# Patient Record
Sex: FEMALE | Race: OTHER | HISPANIC OR LATINO | Employment: UNEMPLOYED | URBAN - METROPOLITAN AREA
[De-identification: names, ages, dates, MRNs, and addresses within clinical notes are randomized per-mention and may not be internally consistent; named-entity substitution may affect disease eponyms.]

---
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
## 2024-05-03 ENCOUNTER — HOSPITAL ENCOUNTER (EMERGENCY)
Facility: HOSPITAL | Age: 19
Discharge: HOME | End: 2024-05-03
Attending: EMERGENCY MEDICINE
Payer: COMMERCIAL

## 2024-05-03 VITALS
SYSTOLIC BLOOD PRESSURE: 126 MMHG | TEMPERATURE: 98.2 F | OXYGEN SATURATION: 99 % | HEART RATE: 101 BPM | RESPIRATION RATE: 20 BRPM | DIASTOLIC BLOOD PRESSURE: 75 MMHG

## 2024-05-03 DIAGNOSIS — R05.9 COUGH: ICD-10-CM

## 2024-05-03 DIAGNOSIS — J04.0 LARYNGITIS: Primary | ICD-10-CM

## 2024-05-03 LAB
FLUAV RNA RESP QL NAA+PROBE: NEGATIVE
FLUBV RNA RESP QL NAA+PROBE: NEGATIVE
RSV RNA RESP QL NAA+PROBE: NEGATIVE
S PYO DNA THROAT QL NAA+PROBE: NOT DETECTED
SARS-COV-2 RNA RESP QL NAA+PROBE: NEGATIVE

## 2024-05-03 PROCEDURE — 0241U HB NFCT DS VIR RESP RNA 4 TRGT: CPT

## 2024-05-03 PROCEDURE — 99284 EMERGENCY DEPT VISIT MOD MDM: CPT | Performed by: EMERGENCY MEDICINE

## 2024-05-03 PROCEDURE — 99283 EMERGENCY DEPT VISIT LOW MDM: CPT

## 2024-05-03 PROCEDURE — 87651 STREP A DNA AMP PROBE: CPT | Performed by: EMERGENCY MEDICINE

## 2024-05-03 RX ORDER — GUAIFENESIN 600 MG/1
600 TABLET, EXTENDED RELEASE ORAL 2 TIMES DAILY
Qty: 28 TABLET | Refills: 0 | Status: SHIPPED | OUTPATIENT
Start: 2024-05-03

## 2024-05-03 RX ORDER — LIDOCAINE HYDROCHLORIDE 20 MG/ML
15 SOLUTION OROPHARYNGEAL ONCE
Status: COMPLETED | OUTPATIENT
Start: 2024-05-03 | End: 2024-05-03

## 2024-05-03 RX ORDER — GUAIFENESIN 600 MG/1
600 TABLET, EXTENDED RELEASE ORAL ONCE
Status: COMPLETED | OUTPATIENT
Start: 2024-05-03 | End: 2024-05-03

## 2024-05-03 RX ORDER — DEXAMETHASONE 4 MG/1
6 TABLET ORAL ONCE
Status: COMPLETED | OUTPATIENT
Start: 2024-05-03 | End: 2024-05-03

## 2024-05-03 RX ADMIN — Medication 15 ML: at 22:29

## 2024-05-03 RX ADMIN — GUAIFENESIN 600 MG: 600 TABLET ORAL at 22:27

## 2024-05-03 RX ADMIN — DEXAMETHASONE 6 MG: 4 TABLET ORAL at 22:35

## 2024-05-03 NOTE — Clinical Note
Sheila Amaro was seen and treated in our emergency department on 5/3/2024.                Diagnosis:     Sheila  may return to work on return date.    She may return on this date: 05/06/2024         If you have any questions or concerns, please don't hesitate to call.      Nicole Bosch, DO    ______________________________           _______________          _______________  Hospital Representative                              Date                                Time

## 2024-05-04 NOTE — ED PROVIDER NOTES
History  Chief Complaint   Patient presents with    Cough     Pt reports cough and sore throat x3 days     HPI  (Sheila Amaro) Sheila Amaro is a 19 y.o. female     They presented to the emergency department on May 4, 2024.   Chief Complaint   Patient presents with    Cough     Pt reports cough and sore throat x3 days   .    The patient states that lost her voice. Patient denies fever, chills, chest pain, shortness of breath, nausea, vomiting, abdominal pain bowel bladder changes, ear pain, or any other complaint at this time.        Prior to Admission Medications   Prescriptions Last Dose Informant Patient Reported? Taking?   albuterol (PROVENTIL HFA,VENTOLIN HFA) 90 mcg/act inhaler   No No   Sig: Inhale 2 puffs every 6 (six) hours as needed for wheezing or shortness of breath With spacer   albuterol (VENTOLIN HFA) 90 mcg/act inhaler   No No   Sig: Inhale 2 puffs every 4 (four) hours as needed for wheezing   Patient not taking: No sig reported   cetirizine (ZyrTEC) 10 mg tablet   No No   Sig: Take 1 tablet (10 mg total) by mouth daily   fluticasone (FLONASE) 50 mcg/act nasal spray   No No   Si sprays into each nostril daily   Patient not taking: No sig reported   medroxyPROGESTERone (DEPO-PROVERA) 150 mg/mL injection   No No   Sig: Inject 1 mL (150 mg total) into a muscle every 3 (three) months   Patient not taking: No sig reported   nystatin (MYCOSTATIN) cream   No No   Sig: Apply topically 4 (four) times a day for 14 days   ondansetron (ZOFRAN) 4 mg tablet   No No   Sig: Take 1 tablet (4 mg total) by mouth every 6 (six) hours      Facility-Administered Medications Last Administration Doses Remaining   medroxyPROGESTERone (DEPO-PROVERA) IM injection 150 mg 2022  3:18 PM           Past Medical History:   Diagnosis Date    Asthma        Past Surgical History:   Procedure Laterality Date    APPENDECTOMY      8-8 years of age        Family History   Problem Relation Age of Onset    Asthma Mother     No Known  Problems Father     No Known Problems Brother     Hypertension Maternal Grandmother     Asthma Maternal Grandmother     Arthritis Maternal Grandmother     No Known Problems Maternal Grandfather     No Known Problems Paternal Grandmother     No Known Problems Paternal Grandfather     No Known Problems Brother     No Known Problems Brother     No Known Problems Brother      I have reviewed and agree with the history as documented.    E-Cigarette/Vaping    E-Cigarette Use Never User      E-Cigarette/Vaping Substances    Nicotine No     THC No     CBD No     Flavoring No     Other No     Unknown No      Social History     Tobacco Use    Smoking status: Never    Smokeless tobacco: Never   Vaping Use    Vaping status: Never Used   Substance Use Topics    Alcohol use: Never    Drug use: Never        Review of Systems   Constitutional:  Negative for chills and fever.   HENT:  Positive for sore throat and voice change. Negative for ear pain.    Eyes:  Negative for pain and visual disturbance.   Respiratory:  Positive for cough. Negative for shortness of breath.    Cardiovascular:  Negative for chest pain and palpitations.   Gastrointestinal:  Negative for abdominal pain, constipation, diarrhea, nausea and vomiting.   Genitourinary:  Negative for dysuria and hematuria.   Musculoskeletal:  Negative for arthralgias and back pain.   Skin:  Negative for color change and rash.   Neurological:  Negative for seizures and syncope.   All other systems reviewed and are negative.      Physical Exam  ED Triage Vitals [05/03/24 2140]   Temperature Pulse Respirations Blood Pressure SpO2   98.2 °F (36.8 °C) 101 20 126/75 99 %      Temp Source Heart Rate Source Patient Position - Orthostatic VS BP Location FiO2 (%)   Oral Monitor -- Right arm --      Pain Score       --             Orthostatic Vital Signs  Vitals:    05/03/24 2140   BP: 126/75   Pulse: 101       Physical Exam  Vitals and nursing note reviewed.   Constitutional:        General: She is not in acute distress.     Appearance: She is well-developed.      Comments: Patient with hoarse voice consistent with laryngitis   HENT:      Head: Normocephalic and atraumatic.   Eyes:      Conjunctiva/sclera: Conjunctivae normal.   Cardiovascular:      Rate and Rhythm: Normal rate and regular rhythm.      Heart sounds: No murmur heard.  Pulmonary:      Effort: Pulmonary effort is normal. No respiratory distress.      Breath sounds: Normal breath sounds. No wheezing, rhonchi or rales.      Comments: Cough during exam, dry cough.  Abdominal:      Palpations: Abdomen is soft.      Tenderness: There is no abdominal tenderness.   Musculoskeletal:         General: No swelling.      Cervical back: Neck supple.   Skin:     General: Skin is warm and dry.      Capillary Refill: Capillary refill takes less than 2 seconds.   Neurological:      Mental Status: She is alert.   Psychiatric:         Mood and Affect: Mood normal.         ED Medications  Medications   Lidocaine Viscous HCl (XYLOCAINE) 2 % mucosal solution 15 mL (15 mL Swish & Spit Given 5/3/24 2229)   guaiFENesin (MUCINEX) 12 hr tablet 600 mg (600 mg Oral Given 5/3/24 2227)   dexamethasone (DECADRON) tablet 6 mg (6 mg Oral Given 5/3/24 2235)       Diagnostic Studies  Results Reviewed       Procedure Component Value Units Date/Time    Strep A PCR [[id number]]  (Normal) Collected: 05/03/24 2246    Lab Status: Final result Specimen: Throat Updated: 05/03/24 2328     STREP A PCR Not Detected    FLU/RSV/COVID - if FLU/RSV clinically relevant [[id number]]  (Normal) Collected: 05/03/24 2230    Lab Status: Final result Specimen: Nares from Nose Updated: 05/03/24 2322     SARS-CoV-2 Negative     INFLUENZA A PCR Negative     INFLUENZA B PCR Negative     RSV PCR Negative    Narrative:      FOR PEDIATRIC PATIENTS - copy/paste COVID Guidelines URL to browser: [email or web address]    SARS-CoV-2 assay is a  Nucleic Acid Amplification assay intended for the  qualitative detection of nucleic acid from SARS-CoV-2 in nasopharyngeal  swabs. Results are for the presumptive identification of SARS-CoV-2 RNA.    Positive results are indicative of infection with SARS-CoV-2, the virus  causing COVID-19, but do not rule out bacterial infection or co-infection  with other viruses. Laboratories within the [place] and its  territories are required to report all positive results to the appropriate  public health authorities. Negative results do not preclude SARS-CoV-2  infection and should not be used as the sole basis for treatment or other  patient management decisions. Negative results must be combined with  clinical observations, patient history, and epidemiological information.  This test has not been FDA cleared or approved.    This test has been authorized by FDA under an Emergency Use Authorization  (EUA). This test is only authorized for the duration of time the  declaration that circumstances exist justifying the authorization of the  emergency use of an in vitro diagnostic tests for detection of SARS-CoV-2  virus and/or diagnosis of COVID-19 infection under section 564(b)(1) of  the Act, 21 U.S.C. 360bbb-3(b)(1), unless the authorization is terminated  or revoked sooner. The test has been validated but independent review by FDA  and CLIA is pending.    Test performed using [employer] GeneXpert: This RT-PCR assay targets N2,  a region unique to SARS-CoV-2. A conserved region in the E-gene was chosen  for pan-Sarbecovirus detection which includes SARS-CoV-2.    According to CMS-2020-01-R, this platform meets the definition of high-throughput technology.                   No orders to display         Procedures  Procedures      ED Course  ED Course as of 05/04/24 0656   Fri May 03, 2024   2230 Will obtain strep and flu/COVID/RSV.  Will not have patient wait on results, instructed patient that we will contact her if the  results are positive and we will send medications to the pharmacy.  Patient expressed understanding was agreeable with this plan.   Sat May 04, 2024   0654 SARS-COV-2: Negative   0654 INFLU A PCR: Negative   0654 INFLU B PCR: Negative   0654 RSV PCR: Negative   0654 STREP A PCR: Not Detected                                       Medical Decision Making  20 yo F presented to ED for cough. Associated symptoms: sore throat, hoarse voice. Exam findings: Voice hoarseness consistent with pharyngitis, mild posterior oropharynx erythema no tonsillar exudates or tonsillar edema lungs clear to auscultation bilaterally, heart regular rate and rhythm.  Differentials diagnoses considered: Strep pharyngitis versus COVID/flu/RSV versus laryngitis versus other viral syndrome. Patient was given his lidocaine for sore throat, Mucinex for cough, Decadron for laryngitis.  Obtained COVID/flu/RSV swab, as well as strep swab for sore throat.  Discussed with patient staying for the results versus going home, she opted for prolonged home and to be contacted as results is positive.  Results are negative at this time after patient started discharge.  Based on these results and H&P, most likely due to viral syndrome. Results and clinical impressions were discussed with patient and family. They expressed understanding. Plan: Discharged home instructed to follow-up primary care doctor, instructed to return emerged part for any new or worsening symptoms, prescription for Mucinex sent to pharmacy. This plan was also discussed with patient, who was agreeable with this plan. Patient was given the opportunity to ask questions in ED. All questions and concerns were addressed in ED.     Amount and/or Complexity of Data Reviewed  Labs: ordered.    Risk  OTC drugs.  Prescription drug management.          Disposition  Final diagnoses:   Laryngitis   Cough     Time reflects when diagnosis was documented in both MDM as applicable and the Disposition within  this note       Time User Action Codes Description Comment    5/3/2024 10:53 PM Nicole Bosch Add [J04.0] Laryngitis     5/3/2024 10:54 PM Nicole Bosch Add [R05.2] Subacute cough     5/3/2024 10:54 PM Nicole Bosch Remove [R05.2] Subacute cough     5/3/2024 10:54 PM Nicole Bosch Add [R05.9] Cough           ED Disposition       ED Disposition   Discharge    Condition   Stable    Date/Time   Fri May 3, 2024 10:54 PM    Comment   Sheila Hudsonz discharge to home/self care.                   Follow-up Information       Follow up With Specialties Details Why Contact Info Additional Information    Your Primary Care Doctor  Call today let them know you were evaluated in the ER and would like to schedule a follow-up appointment      [facility] Emergency Department Emergency Medicine Go to  As needed, If symptoms worsen [address] [place] [id number]  [phone] [facility] Emergency Department, [facility] [place], [place], [address]            Discharge Medication List as of 5/3/2024 10:56 PM        START taking these medications    Details   guaiFENesin (MUCINEX) 600 mg 12 hr tablet Take 1 tablet (600 mg total) by mouth 2 (two) times a day, Starting Fri 5/3/2024, Normal           CONTINUE these medications which have NOT CHANGED    Details   !! albuterol (PROVENTIL HFA,VENTOLIN HFA) 90 mcg/act inhaler Inhale 2 puffs every 6 (six) hours as needed for wheezing or shortness of breath With spacer, Starting Sat 12/23/2023, Normal      !! albuterol (VENTOLIN HFA) 90 mcg/act inhaler Inhale 2 puffs every 4 (four) hours as needed for wheezing, Starting Fri 2/1/2019, Normal      cetirizine (ZyrTEC) 10 mg tablet Take 1 tablet (10 mg total) by mouth daily, Starting Mon 4/5/2021, Until Tue 9/13/2022, Normal      fluticasone (FLONASE) 50 mcg/act nasal spray 2 sprays into each nostril daily, Starting Mon 4/5/2021, Normal      medroxyPROGESTERone  (DEPO-PROVERA) 150 mg/mL injection Inject 1 mL (150 mg total) into a muscle every 3 (three) months, Starting Tue 1/18/2022, Normal      nystatin (MYCOSTATIN) cream Apply topically 4 (four) times a day for 14 days, Starting Mon 4/5/2021, Until Mon 4/19/2021, Normal      ondansetron (ZOFRAN) 4 mg tablet Take 1 tablet (4 mg total) by mouth every 6 (six) hours, Starting Tue 12/20/2022, Normal       !! - Potential duplicate medications found. Please discuss with provider.            PDMP Review       None             ED Provider  Attending physically available and evaluated Sheila Amaro. I managed the patient along with the ED Attending.    Electronically Signed by           Nicole Bosch DO  05/04/24 0743

## 2024-05-07 NOTE — ED ATTENDING ATTESTATION
5/3/2024  I, Eve Staley MD, saw and evaluated the patient. I have discussed the patient with the resident/non-physician practitioner and agree with the resident's/non-physician practitioner's findings, Plan of Care, and MDM as documented in the resident's/non-physician practitioner's note, except where noted. All available labs and Radiology studies were reviewed.  I was present for key portions of any procedure(s) performed by the resident/non-physician practitioner and I was immediately available to provide assistance.       At this point I agree with the current assessment done in the Emergency Department.  I have conducted an independent evaluation of this patient a history and physical is as follows:    19-year-old presented to the ER with cough, sore throat and lost her voice.  Lungs are clear.  Normal cardiac exam.  No abscess in the mouth.  No stridor.  Tolerating secretions.  Likely combination of pharyngitis/laryngitis.  Agree with steroids.  Viral swab, strep swab    ED Course         Critical Care Time  Procedures

## 2025-05-23 ENCOUNTER — APPOINTMENT (EMERGENCY)
Dept: RADIOLOGY | Facility: HOSPITAL | Age: 20
End: 2025-05-23

## 2025-05-23 ENCOUNTER — HOSPITAL ENCOUNTER (EMERGENCY)
Facility: HOSPITAL | Age: 20
Discharge: HOME | End: 2025-05-23
Attending: EMERGENCY MEDICINE

## 2025-05-23 VITALS
TEMPERATURE: 98.1 F | DIASTOLIC BLOOD PRESSURE: 67 MMHG | OXYGEN SATURATION: 97 % | HEART RATE: 87 BPM | RESPIRATION RATE: 16 BRPM | WEIGHT: 125.22 LBS | SYSTOLIC BLOOD PRESSURE: 92 MMHG | BODY MASS INDEX: 22.9 KG/M2

## 2025-05-23 DIAGNOSIS — O36.80X0 PREGNANCY OF UNKNOWN ANATOMIC LOCATION: Primary | ICD-10-CM

## 2025-05-23 LAB
ALBUMIN SERPL BCG-MCNC: 4.3 G/DL (ref 3.5–5)
ALP SERPL-CCNC: 60 U/L (ref 34–104)
ALT SERPL W P-5'-P-CCNC: 6 U/L (ref 7–52)
ANION GAP SERPL CALCULATED.3IONS-SCNC: 8 MMOL/L (ref 4–13)
AST SERPL W P-5'-P-CCNC: 11 U/L (ref 13–39)
B-HCG SERPL-ACNC: 8461 MIU/ML (ref 0–5)
BACTERIA UR QL AUTO: ABNORMAL /HPF
BASOPHILS # BLD AUTO: 0.04 THOUSANDS/ÂΜL (ref 0–0.1)
BASOPHILS NFR BLD AUTO: 1 % (ref 0–1)
BILIRUB SERPL-MCNC: 0.4 MG/DL (ref 0.2–1)
BILIRUB UR QL STRIP: NEGATIVE
BUN SERPL-MCNC: 17 MG/DL (ref 5–25)
CALCIUM SERPL-MCNC: 9.2 MG/DL (ref 8.4–10.2)
CHLORIDE SERPL-SCNC: 106 MMOL/L (ref 96–108)
CLARITY UR: CLEAR
CO2 SERPL-SCNC: 20 MMOL/L (ref 21–32)
COLOR UR: COLORLESS
CREAT SERPL-MCNC: 0.6 MG/DL (ref 0.6–1.3)
EOSINOPHIL # BLD AUTO: 0.05 THOUSAND/ÂΜL (ref 0–0.61)
EOSINOPHIL NFR BLD AUTO: 1 % (ref 0–6)
ERYTHROCYTE [DISTWIDTH] IN BLOOD BY AUTOMATED COUNT: 13.2 % (ref 11.6–15.1)
EXT PREGNANCY TEST URINE: POSITIVE
EXT. CONTROL: ABNORMAL
GFR SERPL CREATININE-BSD FRML MDRD: 130 ML/MIN/1.73SQ M
GLUCOSE SERPL-MCNC: 82 MG/DL (ref 65–140)
GLUCOSE UR STRIP-MCNC: NEGATIVE MG/DL
HCT VFR BLD AUTO: 39 % (ref 34.8–46.1)
HGB BLD-MCNC: 13.4 G/DL (ref 11.5–15.4)
HGB UR QL STRIP.AUTO: NEGATIVE
IMM GRANULOCYTES # BLD AUTO: 0.01 THOUSAND/UL (ref 0–0.2)
IMM GRANULOCYTES NFR BLD AUTO: 0 % (ref 0–2)
KETONES UR STRIP-MCNC: NEGATIVE MG/DL
LEUKOCYTE ESTERASE UR QL STRIP: ABNORMAL
LIPASE SERPL-CCNC: 20 U/L (ref 11–82)
LYMPHOCYTES # BLD AUTO: 2.15 THOUSANDS/ÂΜL (ref 0.6–4.47)
LYMPHOCYTES NFR BLD AUTO: 29 % (ref 14–44)
MCH RBC QN AUTO: 31.3 PG (ref 26.8–34.3)
MCHC RBC AUTO-ENTMCNC: 34.4 G/DL (ref 31.4–37.4)
MCV RBC AUTO: 91 FL (ref 82–98)
MONOCYTES # BLD AUTO: 0.56 THOUSAND/ÂΜL (ref 0.17–1.22)
MONOCYTES NFR BLD AUTO: 8 % (ref 4–12)
NEUTROPHILS # BLD AUTO: 4.61 THOUSANDS/ÂΜL (ref 1.85–7.62)
NEUTS SEG NFR BLD AUTO: 61 % (ref 43–75)
NITRITE UR QL STRIP: NEGATIVE
NON-SQ EPI CELLS URNS QL MICRO: ABNORMAL /HPF
NRBC BLD AUTO-RTO: 0 /100 WBCS
PH UR STRIP.AUTO: 6 [PH]
PLATELET # BLD AUTO: 215 THOUSANDS/UL (ref 149–390)
PMV BLD AUTO: 10.3 FL (ref 8.9–12.7)
POTASSIUM SERPL-SCNC: 3.9 MMOL/L (ref 3.5–5.3)
PROT SERPL-MCNC: 7.5 G/DL (ref 6.4–8.4)
PROT UR STRIP-MCNC: NEGATIVE MG/DL
RBC # BLD AUTO: 4.28 MILLION/UL (ref 3.81–5.12)
RBC #/AREA URNS AUTO: ABNORMAL /HPF
SODIUM SERPL-SCNC: 134 MMOL/L (ref 135–147)
SP GR UR STRIP.AUTO: 1.01 (ref 1–1.03)
UROBILINOGEN UR STRIP-ACNC: <2 MG/DL
WBC # BLD AUTO: 7.42 THOUSAND/UL (ref 4.31–10.16)
WBC #/AREA URNS AUTO: ABNORMAL /HPF

## 2025-05-23 PROCEDURE — 87591 N.GONORRHOEAE DNA AMP PROB: CPT

## 2025-05-23 PROCEDURE — 99285 EMERGENCY DEPT VISIT HI MDM: CPT | Performed by: EMERGENCY MEDICINE

## 2025-05-23 PROCEDURE — 87491 CHLMYD TRACH DNA AMP PROBE: CPT

## 2025-05-23 PROCEDURE — 83690 ASSAY OF LIPASE: CPT | Performed by: EMERGENCY MEDICINE

## 2025-05-23 PROCEDURE — 80053 COMPREHEN METABOLIC PANEL: CPT | Performed by: EMERGENCY MEDICINE

## 2025-05-23 PROCEDURE — 81025 URINE PREGNANCY TEST: CPT | Performed by: EMERGENCY MEDICINE

## 2025-05-23 PROCEDURE — 84702 CHORIONIC GONADOTROPIN TEST: CPT

## 2025-05-23 PROCEDURE — 85025 COMPLETE CBC W/AUTO DIFF WBC: CPT | Performed by: EMERGENCY MEDICINE

## 2025-05-23 PROCEDURE — 36415 COLL VENOUS BLD VENIPUNCTURE: CPT

## 2025-05-23 PROCEDURE — 99284 EMERGENCY DEPT VISIT MOD MDM: CPT

## 2025-05-23 PROCEDURE — 76815 OB US LIMITED FETUS(S): CPT

## 2025-05-23 PROCEDURE — 81001 URINALYSIS AUTO W/SCOPE: CPT

## 2025-05-23 NOTE — ED ATTENDING ATTESTATION
5/23/2025  I, Zeb Ovalles MD, saw and evaluated the patient. I have discussed the patient with the resident/non-physician practitioner and agree with the resident's/non-physician practitioner's findings, Plan of Care, and MDM as documented in the resident's/non-physician practitioner's note, except where noted. All available labs and Radiology studies were reviewed.  I was present for key portions of any procedure(s) performed by the resident/non-physician practitioner and I was immediately available to provide assistance.       At this point I agree with the current assessment done in the Emergency Department.  I have conducted an independent evaluation of this patient a history and physical is as follows:    20-year-old female presents to the emergency department for evaluation of lower abdominal pain.  Pain is associated with some nausea, but no vomiting.  No dysuria or hematuria.  No abnormal vaginal bleeding or discharge.  No fevers or chills.    On exam, patient was comfortably in bed in no acute distress, and is normocephalic atraumatic, pupils equal round reactive, heart is regular rate and rhythm with a distal pulse, no increased work of breathing respiratory distress, or stridor.  Abdomen is soft, nontender nondistended without rebound or guarding.    Differential diagnosis includes but is not limited to pregnancy, cystitis, ectopic pregnancy, colitis, viral illness.  First nurse abdominal labs reviewed and unremarkable.  Urine pregnancy test was positive.  Will check quantitative hCG, urinalysis and test for gonorrhea/chlamydia.    Urinalysis negative for infection, unable to visualize definitive IUP on POCUS, will get formal pelvic ultrasound.    Pelvic ultrasound demonstrates gestational sac, likely representing early gestation.  Symptoms currently well-controlled at this time.  Stable for discharge with outpatient OB/GYN follow-up, referral was placed.  Patient instructed to start taking prenatal  vitamins and to take Tylenol 500 mg every 6 hours as needed for pain.          ED Course         Critical Care Time  Procedures

## 2025-05-23 NOTE — ED PROVIDER NOTES
"Time reflects when diagnosis was documented in both MDM as applicable and the Disposition within this note       Time User Action Codes Description Comment    5/23/2025  7:55 PM Eleanor Pan Add [O36.80X0] Pregnancy of unknown anatomic location           ED Disposition       ED Disposition   Discharge    Condition   Stable    Date/Time   Fri May 23, 2025  7:55 PM    Comment   Sheila Amaro discharge to home/self care.                   Assessment & Plan       Medical Decision Making  Patient is a 20 y.o. female who presents to the ED with lower abdominal pain and nausea.    Vital signs within normal limits. On exam soft nontender abdomen, well-appearing.    History and physical exam most consistent with early pregnancy. However, differential diagnosis included but not limited to UTI, STD, ectopic pregnancy, GI illness.     Plan: CBC, CMP, lipase, urine pregnancy    Urine pregnancy is positive.  Will order quantitative level.  Patient like to be tested for gonorrhea and chlamydia as she has a history of these STDs.    View ED course above for further discussion on patient workup.         All labs reviewed and utilized in the medical decision making process  All radiology studies independently viewed by me and interpreted by the radiologist.  I reviewed all testing with the patient.     Upon re-evaluation patient remains asymptomatic at this time.  Formal ultrasound saw possible single intrauterine gestational sac however no fetal pole or yolk sac.  Referral to OB/GYN has been placed to follow-up to confirm intrauterine pregnancy.  Patient will receive phone call with the  chlamydia results if positive. Discussed return precautions and supportive care. Encouraged PCP followup. Patient/guardian agrees and understands plan. All questions answered.       Disposition: Stable for discharge    Portions of the record may have been created with voice recognition software. Occasional wrong word or \"sound a like\" " substitutions may have occurred due to the inherent limitations of voice recognition software. Read the chart carefully and recognize, using context, where substitutions have occurred.      Amount and/or Complexity of Data Reviewed  Labs: ordered. Decision-making details documented in ED Course.  Radiology: ordered.        ED Course as of 05/24/25 1301   Fri May 23, 2025   1822 PREGNANCY TEST URINE(!): Positive   1855 Attempted POCUS pelvis, visualized possible gestational sac however no fetal pole or yolk sac       Medications - No data to display    ED Risk Strat Scores                    No data recorded                            History of Present Illness       Chief Complaint   Patient presents with    Abdominal Pain     Lower quadrant with nausea for the past week.       Past Medical History[1]   Past Surgical History[2]   Family History[3]   Social History[4]   E-Cigarette/Vaping    E-Cigarette Use Never User       E-Cigarette/Vaping Substances    Nicotine No     THC No     CBD No     Flavoring No     Other No     Unknown No       I have reviewed and agree with the history as documented.     20-year-old female past medical history of STDs presents for evaluation of lower abdominal pain and nausea.  Denies vomiting.  Denies diarrhea.  This started about a week ago.  Her last menstrual period started on April 19.  Denies dysuria, hematuria, urinary frequency, vaginal discharge, vaginal bleeding, fever, chills.      Abdominal Pain  Associated symptoms: nausea    Associated symptoms: no chest pain, no chills, no cough, no dysuria, no fever, no hematuria, no shortness of breath, no sore throat, no vaginal bleeding, no vaginal discharge and no vomiting        Review of Systems   Constitutional:  Negative for chills and fever.   HENT:  Negative for ear pain and sore throat.    Eyes:  Negative for pain and visual disturbance.   Respiratory:  Negative for cough and shortness of breath.    Cardiovascular:  Negative  for chest pain and palpitations.   Gastrointestinal:  Positive for abdominal pain and nausea. Negative for vomiting.   Genitourinary:  Negative for dysuria, flank pain, hematuria, vaginal bleeding and vaginal discharge.   Musculoskeletal:  Negative for arthralgias and back pain.   Skin:  Negative for color change and rash.   Neurological:  Negative for seizures and syncope.   All other systems reviewed and are negative.          Objective       ED Triage Vitals [05/23/25 1602]   Temperature Pulse Blood Pressure Respirations SpO2 Patient Position - Orthostatic VS   (!) 97.2 °F (36.2 °C) 94 108/68 16 98 % Sitting      Temp Source Heart Rate Source BP Location FiO2 (%) Pain Score    Temporal Monitor Left arm -- 7      Vitals      Date and Time Temp Pulse SpO2 Resp BP Pain Score FACES Pain Rating User   05/23/25 1752 98.1 °F (36.7 °C) 87 97 % 16 92/67 No Pain -- AK   05/23/25 1602 97.2 °F (36.2 °C) 94 98 % 16 108/68 7 -- HB            Physical Exam  Vitals and nursing note reviewed.   Constitutional:       General: She is not in acute distress.     Appearance: She is well-developed. She is not ill-appearing.   HENT:      Head: Normocephalic and atraumatic.     Eyes:      Conjunctiva/sclera: Conjunctivae normal.       Cardiovascular:      Rate and Rhythm: Normal rate and regular rhythm.      Heart sounds: No murmur heard.  Pulmonary:      Effort: Pulmonary effort is normal. No respiratory distress.      Breath sounds: Normal breath sounds.   Abdominal:      Palpations: Abdomen is soft.      Tenderness: There is no abdominal tenderness. There is no guarding or rebound.     Musculoskeletal:         General: No swelling.      Cervical back: Neck supple.     Skin:     General: Skin is warm and dry.      Capillary Refill: Capillary refill takes less than 2 seconds.     Neurological:      Mental Status: She is alert.     Psychiatric:         Mood and Affect: Mood normal.         Results Reviewed       Procedure Component Value  Units Date/Time    Urine Microscopic [[id number]]  (Abnormal) Collected: 05/23/25 1843    Lab Status: Final result Specimen: Urine, Clean Catch Updated: 05/23/25 2011     RBC, UA None Seen /hpf      WBC, UA 4-10 /hpf      Epithelial Cells Occasional /hpf      Bacteria, UA None Seen /hpf     hCG, quantitative [[id number]]  (Abnormal) Collected: 05/23/25 1843    Lab Status: Final result Specimen: Blood from Arm, Left Updated: 05/23/25 1947     HCG, Quant 8,461.0 mIU/mL     Narrative:       Expected Ranges:    HCG results between 5.0 and 25.0 mIU/mL may be indicative of early pregnancy but should be interpreted in light of the total clinical presentation.    HCG can rise to detectable levels in crystal and post menopausal women (0-11.6 mIU/mL).     Approximate               Approximate HCG  Gestation age          Concentration ( mIU/mL)  _____________          ______________________   Weeks                      HCG values  0.2-1                       5-50  1-2                           2-3                         100-5000  3-4                         500-05555  4-5                         1000-13411  5-6                         50014-040011  6-8                         31561-250536  8-12                        [id number]      UA w Reflex to Microscopic w Reflex to Culture [[id number]]  (Abnormal) Collected: 05/23/25 1843    Lab Status: Final result Specimen: Urine, Clean Catch Updated: 05/23/25 1912     Color, UA Colorless     Clarity, UA Clear     Specific Gravity, UA 1.009     pH, UA 6.0     Leukocytes, UA Small     Nitrite, UA Negative     Protein, UA Negative mg/dl      Glucose, UA Negative mg/dl      Ketones, UA Negative mg/dl      Urobilinogen, UA <2.0 mg/dl      Bilirubin, UA Negative     Occult Blood, UA Negative    Chlamydia/GC amplified DNA by PCR [[id number]] Collected: 05/23/25 1843    Lab Status: In process Specimen: Urine, Other Updated: 05/23/25 1851    Comprehensive metabolic panel [[id number]]   (Abnormal) Collected: 05/23/25 1608    Lab Status: Final result Specimen: Blood from Arm, Right Updated: 05/23/25 1641     Sodium 134 mmol/L      Potassium 3.9 mmol/L      Chloride 106 mmol/L      CO2 20 mmol/L      ANION GAP 8 mmol/L      BUN 17 mg/dL      Creatinine 0.60 mg/dL      Glucose 82 mg/dL      Calcium 9.2 mg/dL      AST 11 U/L      ALT 6 U/L      Alkaline Phosphatase 60 U/L      Total Protein 7.5 g/dL      Albumin 4.3 g/dL      Total Bilirubin 0.40 mg/dL      eGFR 130 ml/min/1.73sq m     Narrative:      National Kid[employer] Foundation guidelines for Chronic Kidney Disease (CKD):     Stage 1 with normal or high GFR (GFR > 90 mL/min/1.73 square meters)    Stage 2 Mild CKD (GFR = 60-89 mL/min/1.73 square meters)    Stage 3A Moderate CKD (GFR = 45-59 mL/min/1.73 square meters)    Stage 3B Moderate CKD (GFR = 30-44 mL/min/1.73 square meters)    Stage 4 Severe CKD (GFR = 15-29 mL/min/1.73 square meters)    Stage 5 End Stage CKD (GFR <15 mL/min/1.73 square meters)  Note: GFR calculation is accurate only with a steady state creatinine    Lipase [[id number]]  (Normal) Collected: 05/23/25 1608    Lab Status: Final result Specimen: Blood from Arm, Right Updated: 05/23/25 1641     Lipase 20 u/L     POCT pregnancy, urine [[id number]]  (Abnormal) Collected: 05/23/25 1623    Lab Status: Final result Specimen: Urine Updated: 05/23/25 1623     EXT Preg Test, Ur Positive     Control Valid    CBC and differential [[id number]] Collected: 05/23/25 1608    Lab Status: Final result Specimen: Blood from Arm, Right Updated: 05/23/25 1622     WBC 7.42 Thousand/uL      RBC 4.28 Million/uL      Hemoglobin 13.4 g/dL      Hematocrit 39.0 %      MCV 91 fL      MCH 31.3 pg      MCHC 34.4 g/dL      RDW 13.2 %      MPV 10.3 fL      Platelets 215 Thousands/uL      nRBC 0 /100 WBCs      Segmented % 61 %      Immature Grans % 0 %      Lymphocytes % 29 %      Monocytes % 8 %      Eosinophils Relative 1 %      Basophils Relative 1 %       Absolute Neutrophils 4.61 Thousands/µL      Absolute Immature Grans 0.01 Thousand/uL      Absolute Lymphocytes 2.15 Thousands/µL      Absolute Monocytes 0.56 Thousand/µL      Eosinophils Absolute 0.05 Thousand/µL      Basophils Absolute 0.04 Thousands/µL             US OB pregnancy limited with transvaginal   Final Interpretation by Yaw Garcia MD (05/23 2038)      Single likely intrauterine gestational sac.   Follow-up serial hCG's.   Ultrasonography at 7 to 10 days is recommended.         Workstation performed: [id number]             POC Pelvic US    Date/Time: 5/23/2025 6:57 PM    Performed by: Eleanor Pan DO  Authorized by: Eleanor Pan DO    Patient location:  ED  Procedure details:     Exam Type:  Diagnostic    Indications: evaluate for IUP and pregnant with abdominal pain      Assessment for: confirm intrauterine pregnancy      Technique:  Transabdominal obstetric (HCG+) exam    Views obtained: uterus (transverse and sagittal)      Image quality: non-diagnostic      Image availability:  Images available in PACS  Uterine findings:     Endometrial stripe: not identified      Intrauterine pregnancy: not identified      Single gestation: not identified      Multiple gestation: not identified      Gestational sac: identified      Yolk sac: not identified      Fetal pole: not identified      Fetal heart rate: not identified    Right adnexa findings:     Right ovary:  Not visualized  Left adnexa findings:     Left ovary:  Not visualized  Other findings:     Free pelvic fluid: not identified      Free peritoneal fluid: not identified    Interpretation:     Ultrasound impressions: indeterminate    Comments:      Visualized possible start of gestational sac, may be gestational sac of early IUP however no yolk sac or fetal pole visualized, stored full on ultrasound unable to save images      ED Medication and Procedure Management   Prior to Admission Medications   Prescriptions Last Dose Informant Patient  Reported? Taking?   albuterol (PROVENTIL HFA,VENTOLIN HFA) 90 mcg/act inhaler   No No   Sig: Inhale 2 puffs every 6 (six) hours as needed for wheezing or shortness of breath With spacer   albuterol (VENTOLIN HFA) 90 mcg/act inhaler   No No   Sig: Inhale 2 puffs every 4 (four) hours as needed for wheezing   Patient not taking: No sig reported   cetirizine (ZyrTEC) 10 mg tablet   No No   Sig: Take 1 tablet (10 mg total) by mouth daily   fluticasone (FLONASE) 50 mcg/act nasal spray   No No   Si sprays into each nostril daily   Patient not taking: No sig reported   guaiFENesin (MUCINEX) 600 mg 12 hr tablet   No No   Sig: Take 1 tablet (600 mg total) by mouth 2 (two) times a day   medroxyPROGESTERone (DEPO-PROVERA) 150 mg/mL injection   No No   Sig: Inject 1 mL (150 mg total) into a muscle every 3 (three) months   Patient not taking: No sig reported   nystatin (MYCOSTATIN) cream   No No   Sig: Apply topically 4 (four) times a day for 14 days   ondansetron (ZOFRAN) 4 mg tablet   No No   Sig: Take 1 tablet (4 mg total) by mouth every 6 (six) hours      Facility-Administered Medications Last Administration Doses Remaining   medroxyPROGESTERone (DEPO-PROVERA) IM injection 150 mg 2022  3:18 PM         Discharge Medication List as of 2025  7:56 PM        CONTINUE these medications which have NOT CHANGED    Details   !! albuterol (PROVENTIL HFA,VENTOLIN HFA) 90 mcg/act inhaler Inhale 2 puffs every 6 (six) hours as needed for wheezing or shortness of breath With spacer, Starting Sat 2023, Normal      !! albuterol (VENTOLIN HFA) 90 mcg/act inhaler Inhale 2 puffs every 4 (four) hours as needed for wheezing, Starting 2019, Normal      cetirizine (ZyrTEC) 10 mg tablet Take 1 tablet (10 mg total) by mouth daily, Starting Mon 2021, Until 2022, Normal      fluticasone (FLONASE) 50 mcg/act nasal spray 2 sprays into each nostril daily, Starting Mon 2021, Normal      guaiFENesin (MUCINEX) 600  mg 12 hr tablet Take 1 tablet (600 mg total) by mouth 2 (two) times a day, Starting Fri 5/3/2024, Normal      medroxyPROGESTERone (DEPO-PROVERA) 150 mg/mL injection Inject 1 mL (150 mg total) into a muscle every 3 (three) months, Starting Tue 1/18/2022, Normal      nystatin (MYCOSTATIN) cream Apply topically 4 (four) times a day for 14 days, Starting Mon 4/5/2021, Until Mon 4/19/2021, Normal      ondansetron (ZOFRAN) 4 mg tablet Take 1 tablet (4 mg total) by mouth every 6 (six) hours, Starting Tue 12/20/2022, Normal       !! - Potential duplicate medications found. Please discuss with provider.          ED SEPSIS DOCUMENTATION   Time reflects when diagnosis was documented in both MDM as applicable and the Disposition within this note       Time User Action Codes Description Comment    5/23/2025  7:55 PM Eleanor Pan Add [O36.80X0] Pregnancy of unknown anatomic location                      [1]   Past Medical History:  Diagnosis Date    Asthma    [2]   Past Surgical History:  Procedure Laterality Date    APPENDECTOMY      8-9 years of age    [3]   Family History  Problem Relation Name Age of Onset    Asthma Mother      No Known Problems Father      No Known Problems Brother      Hypertension Maternal Grandmother      Asthma Maternal Grandmother      Arthritis Maternal Grandmother      No Known Problems Maternal Grandfather      No Known Problems Paternal Grandmother      No Known Problems Paternal Grandfather      No Known Problems Brother      No Known Problems Brother      No Known Problems Brother     [4]   Social History  Tobacco Use    Smoking status: Never    Smokeless tobacco: Never   Vaping Use    Vaping status: Never Used   Substance Use Topics    Alcohol use: Never    Drug use: Never        Eleanor Pan DO  05/24/25 1301

## 2025-05-23 NOTE — ED NOTES
Patient ambulatory to bathroom at this time to provide urine sample.      Eleanor eRilly RN  05/23/25 0353

## 2025-05-23 NOTE — Clinical Note
Sheila Amaro was seen and treated in our emergency department on 5/23/2025.                Diagnosis:     Tylera  .    She may return on this date:     Please excuse Sheila from work on 5/23/25     If you have any questions or concerns, please don't hesitate to call.      Eleanor Pan, DO    ______________________________           _______________          _______________  Hospital Representative                              Date                                Time

## 2025-05-23 NOTE — DISCHARGE INSTRUCTIONS
You were seen in the emergency department for pregnancy.  No intrauterine pregnancy was seen on ultrasound however you may be too early in your pregnancy.  Referral to OB/GYN has been placed.  Please follow-up with the specialist to further evaluate.  Return to the emergency department if you develop vaginal bleeding, pelvic pain, burning with urination, blood in your urine, or any new or concerning symptoms.

## 2025-05-27 LAB
C TRACH DNA SPEC QL NAA+PROBE: NEGATIVE
N GONORRHOEA DNA SPEC QL NAA+PROBE: NEGATIVE

## 2025-06-12 NOTE — PROGRESS NOTES
OB/GYN VIABILITY SCAN  Sheila Amaro; 2005  [id number]      ASSESSMENT:  Sheila Amaro is a 20 y.o.  presenting for amenorrhea.  Pregnancy confirmed, dating consistent with LMP. SIRISHA 2026.      PLAN:  - Daily prenatal vitamin  - Prenatal labs ordered  - MFM referral placed   - Prenatal nursing intake appointment in 2 weeks  - Prenatal H&P appointment in 4 weeks  - B6 and Unisom prescribed for nausea/vomiting  - Pepcid prescribed for GERD     SUBJECTIVE:  Sheila Amaro is a 20 y.o.  with an LMP of 2025 who presents for amenorrhea. She was seen in the ED on  for lower abdominal pain and at that time, had a positive pregnancy test. BHCG was 8,461. TVUS on  showed single intrauterine gestational sac but no fetal pole.     Positive UPT in office today. Patient notes that this pregnancy was unplanned but desired. She was not using contraception at the time. She reports she is certain of her LMP and that she has irregular menses. She has has no vaginal bleeding since her LMP and no further abdominal pain since her ED visit.     She has been experiencing daily nausea and vomiting (usually once per day). She has been tolerating PO intake. She is also experiencing heartburn and has not taken anything for this. We discussed daily B6 and unisom for nausea/vomiting and daily Pepcid for GERD symptoms as well as Tums PRN.       OBJECTIVE:  LMP 2025 (Approximate)     Physical Exam  Constitutional:       General: She is not in acute distress.     Appearance: Normal appearance.   Genitourinary:      Vulva normal.   HENT:      Head: Normocephalic and atraumatic.     Eyes:      Extraocular Movements: Extraocular movements intact.       Cardiovascular:      Rate and Rhythm: Normal rate.   Pulmonary:      Effort: Pulmonary effort is normal. No respiratory distress.   Abdominal:      General: Abdomen is flat.     Neurological:      Mental Status: She is alert.     Psychiatric:         Mood and  Affect: Mood normal.         Behavior: Behavior normal.   Vitals reviewed. Exam conducted with a chaperone present.         Transvaginal Pelvic Ultrasound:  Watson IUP  Yolk sac present  CRL 18.6 mm, consistent with 8w3d  By LMP would be 8w2d  Final dating will be by LMP  Cardiac activity present,  bpm  Bilateral ovaries visualized and normal in appearance  No adnexal masses appreciated  No fluid in the posterior cul de sac                      Liat Kumar MD  06/12/25  11:12 AM

## 2025-06-16 ENCOUNTER — ULTRASOUND (OUTPATIENT)
Dept: OBGYN CLINIC | Facility: CLINIC | Age: 20
End: 2025-06-16

## 2025-06-16 VITALS
SYSTOLIC BLOOD PRESSURE: 84 MMHG | DIASTOLIC BLOOD PRESSURE: 55 MMHG | HEIGHT: 62 IN | HEART RATE: 86 BPM | WEIGHT: 126 LBS | BODY MASS INDEX: 23.19 KG/M2

## 2025-06-16 DIAGNOSIS — Z59.9 FINANCIAL DIFFICULTIES: ICD-10-CM

## 2025-06-16 DIAGNOSIS — Z32.01 POSITIVE PREGNANCY TEST: Primary | ICD-10-CM

## 2025-06-16 DIAGNOSIS — R11.2 NAUSEA AND VOMITING, UNSPECIFIED VOMITING TYPE: ICD-10-CM

## 2025-06-16 RX ORDER — SWAB
1 SWAB, NON-MEDICATED MISCELLANEOUS DAILY
COMMUNITY

## 2025-06-16 RX ORDER — PYRIDOXINE HCL (VITAMIN B6) 25 MG
25 TABLET ORAL DAILY
Qty: 30 TABLET | Refills: 1 | Status: SHIPPED | OUTPATIENT
Start: 2025-06-16 | End: 2025-08-15

## 2025-06-16 RX ORDER — FAMOTIDINE 20 MG/1
20 TABLET, FILM COATED ORAL DAILY
Qty: 30 TABLET | Refills: 1 | Status: SHIPPED | OUTPATIENT
Start: 2025-06-16

## 2025-06-16 SDOH — ECONOMIC STABILITY - INCOME SECURITY: PROBLEM RELATED TO HOUSING AND ECONOMIC CIRCUMSTANCES, UNSPECIFIED: Z59.9

## 2025-06-17 ENCOUNTER — PATIENT OUTREACH (OUTPATIENT)
Dept: OBGYN CLINIC | Facility: CLINIC | Age: 20
End: 2025-06-17

## 2025-06-17 ENCOUNTER — TELEPHONE (OUTPATIENT)
Age: 20
End: 2025-06-17

## 2025-06-20 ENCOUNTER — PATIENT OUTREACH (OUTPATIENT)
Dept: OBGYN CLINIC | Facility: CLINIC | Age: 20
End: 2025-06-20

## 2025-06-20 NOTE — LETTER
[address]  [address]  [place] [address]  [phone]    Re:social work outreach    6/20/2025       Dear Sheila,    We tried to reach you by phone on 06/20/2025 and was unfortunately unable to reach you.  It is important that you contact the [facility] as soon as possible at: Dept: [phone] Please call Brittney Blackwell @ [phone]. Thank you!     Sincerely,         GLEN Woods

## 2025-06-20 NOTE — PROGRESS NOTES
[facility] reviewed pt chart and pt had ultrasound on 06/16/2025, no follow up appt has been made. [facility] attempted to reach pt today and the only number listed in chart is not accepting calls. [facility] will send message via [employer] and attempt call at a later time.

## 2025-06-23 ENCOUNTER — APPOINTMENT (OUTPATIENT)
Dept: LAB | Age: 20
End: 2025-06-23
Payer: COMMERCIAL

## 2025-06-23 DIAGNOSIS — Z32.01 POSITIVE PREGNANCY TEST: ICD-10-CM

## 2025-06-23 LAB
ABO GROUP BLD: NORMAL
BACTERIA UR QL AUTO: ABNORMAL /HPF
BASOPHILS # BLD AUTO: 0.03 THOUSANDS/ÂΜL (ref 0–0.1)
BASOPHILS NFR BLD AUTO: 0 % (ref 0–1)
BILIRUB UR QL STRIP: NEGATIVE
BLD GP AB SCN SERPL QL: NEGATIVE
CLARITY UR: ABNORMAL
COLOR UR: YELLOW
EOSINOPHIL # BLD AUTO: 0.03 THOUSAND/ÂΜL (ref 0–0.61)
EOSINOPHIL NFR BLD AUTO: 0 % (ref 0–6)
ERYTHROCYTE [DISTWIDTH] IN BLOOD BY AUTOMATED COUNT: 13 % (ref 11.6–15.1)
GLUCOSE UR STRIP-MCNC: ABNORMAL MG/DL
HCT VFR BLD AUTO: 40.4 % (ref 34.8–46.1)
HGB BLD-MCNC: 13.1 G/DL (ref 11.5–15.4)
HGB UR QL STRIP.AUTO: NEGATIVE
IMM GRANULOCYTES # BLD AUTO: 0.02 THOUSAND/UL (ref 0–0.2)
IMM GRANULOCYTES NFR BLD AUTO: 0 % (ref 0–2)
KETONES UR STRIP-MCNC: ABNORMAL MG/DL
LEUKOCYTE ESTERASE UR QL STRIP: ABNORMAL
LYMPHOCYTES # BLD AUTO: 1.18 THOUSANDS/ÂΜL (ref 0.6–4.47)
LYMPHOCYTES NFR BLD AUTO: 15 % (ref 14–44)
MCH RBC QN AUTO: 30.8 PG (ref 26.8–34.3)
MCHC RBC AUTO-ENTMCNC: 32.4 G/DL (ref 31.4–37.4)
MCV RBC AUTO: 95 FL (ref 82–98)
MONOCYTES # BLD AUTO: 0.38 THOUSAND/ÂΜL (ref 0.17–1.22)
MONOCYTES NFR BLD AUTO: 5 % (ref 4–12)
MUCOUS THREADS UR QL AUTO: ABNORMAL
NEUTROPHILS # BLD AUTO: 6.29 THOUSANDS/ÂΜL (ref 1.85–7.62)
NEUTS SEG NFR BLD AUTO: 80 % (ref 43–75)
NITRITE UR QL STRIP: POSITIVE
NON-SQ EPI CELLS URNS QL MICRO: ABNORMAL /HPF
NRBC BLD AUTO-RTO: 0 /100 WBCS
PH UR STRIP.AUTO: 7 [PH]
PLATELET # BLD AUTO: 196 THOUSANDS/UL (ref 149–390)
PMV BLD AUTO: 11.7 FL (ref 8.9–12.7)
PROT UR STRIP-MCNC: ABNORMAL MG/DL
RBC # BLD AUTO: 4.26 MILLION/UL (ref 3.81–5.12)
RBC #/AREA URNS AUTO: ABNORMAL /HPF
RH BLD: POSITIVE
RUBV IGG SERPL IA-ACNC: 19.9 IU/ML
SP GR UR STRIP.AUTO: 1.03 (ref 1–1.03)
SPECIMEN EXPIRATION DATE: NORMAL
UROBILINOGEN UR STRIP-ACNC: <2 MG/DL
WBC # BLD AUTO: 7.93 THOUSAND/UL (ref 4.31–10.16)
WBC #/AREA URNS AUTO: ABNORMAL /HPF

## 2025-06-23 PROCEDURE — 86762 RUBELLA ANTIBODY: CPT

## 2025-06-23 PROCEDURE — 86900 BLOOD TYPING SEROLOGIC ABO: CPT

## 2025-06-23 PROCEDURE — 86850 RBC ANTIBODY SCREEN: CPT

## 2025-06-23 PROCEDURE — 81001 URINALYSIS AUTO W/SCOPE: CPT

## 2025-06-23 PROCEDURE — 87186 SC STD MICRODIL/AGAR DIL: CPT

## 2025-06-23 PROCEDURE — 36415 COLL VENOUS BLD VENIPUNCTURE: CPT

## 2025-06-23 PROCEDURE — 87086 URINE CULTURE/COLONY COUNT: CPT

## 2025-06-23 PROCEDURE — 85025 COMPLETE CBC W/AUTO DIFF WBC: CPT

## 2025-06-23 PROCEDURE — 86803 HEPATITIS C AB TEST: CPT

## 2025-06-23 PROCEDURE — 87389 HIV-1 AG W/HIV-1&-2 AB AG IA: CPT

## 2025-06-23 PROCEDURE — 87340 HEPATITIS B SURFACE AG IA: CPT

## 2025-06-23 PROCEDURE — 86780 TREPONEMA PALLIDUM: CPT

## 2025-06-23 PROCEDURE — 87077 CULTURE AEROBIC IDENTIFY: CPT

## 2025-06-23 PROCEDURE — 86706 HEP B SURFACE ANTIBODY: CPT

## 2025-06-23 PROCEDURE — 86901 BLOOD TYPING SEROLOGIC RH(D): CPT

## 2025-06-24 LAB
HBV SURFACE AB SER-ACNC: 3.46 MIU/ML
HBV SURFACE AG SER QL: NORMAL
HCV AB SER QL: NORMAL
HIV 1+2 AB+HIV1 P24 AG SERPL QL IA: NORMAL
TREPONEMA PALLIDUM IGG+IGM AB [PRESENCE] IN SERUM OR PLASMA BY IMMUNOASSAY: NORMAL

## 2025-06-25 DIAGNOSIS — O23.41 URINARY TRACT INFECTION AFFECTING CARE OF MOTHER IN FIRST TRIMESTER, ANTEPARTUM: Primary | ICD-10-CM

## 2025-06-25 LAB — BACTERIA UR CULT: ABNORMAL

## 2025-06-25 RX ORDER — CEPHALEXIN 500 MG/1
500 CAPSULE ORAL EVERY 12 HOURS SCHEDULED
Qty: 14 CAPSULE | Refills: 0 | Status: SHIPPED | OUTPATIENT
Start: 2025-06-25 | End: 2025-07-02

## 2025-07-01 ENCOUNTER — INITIAL PRENATAL (OUTPATIENT)
Dept: OBGYN CLINIC | Facility: CLINIC | Age: 20
End: 2025-07-01

## 2025-07-01 ENCOUNTER — PATIENT OUTREACH (OUTPATIENT)
Dept: OBGYN CLINIC | Facility: CLINIC | Age: 20
End: 2025-07-01

## 2025-07-01 DIAGNOSIS — Z34.01 PRENATAL CARE, FIRST PREGNANCY IN FIRST TRIMESTER: Primary | ICD-10-CM

## 2025-07-01 PROCEDURE — NURSE

## 2025-07-01 NOTE — LETTER
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    We recommend that all pregnant women:    1. Have a well-ventilated workspace.  2. Wear low-heeled shoes.  3. Work no more than 40 hours per week.  4. Have a 15 minute break every 2 hours and at least 30 minutes for a meal break.   5. Use good body mechanics by bending at your knees to avoid back strain and lift no more than 20 pounds without assistance. Will need assistance with lifting over 20 lbs.   6. Have ready access to bathrooms and water.      She may continue to work until her due date unless medical complications arise. We anticipate she may return to work in 6-8 weeks after delivery.     Sincerely,  [facility]

## 2025-07-01 NOTE — PROGRESS NOTES
OB INTAKE INTERVIEW  Pt presents for OB intake.     OB History    Para Term  AB Living   1 0 0 0 0 0   SAB IAB Ectopic Multiple Live Births   0 0 0 0 0      # Outcome Date GA Lbr Jace/2nd Weight Sex Type Anes PTL Lv   1 Current              Hx of  delivery prior to 36 weeks 6 days:  N/A   If yes, place a referral for cervical surveillance at 16 weeks.   Last Menstrual Period:    Patient's last menstrual period was 2025 (approximate).     Ultrasound date: 2025  8 weeks 2 days     Estimated Date of Delivery: 26   by LMP  H&P visit scheduled. 2025 @ 0800  with Dr. Oh     Last pap smear: N/A, TBC     Current Issues:  Constipation :   No  Headaches :   No  Cramping:  No  Spotting :   No  PICA cravings :  No  FOB Involved:   Yes  Planned pregnancy:  No  I have these concerns about this prenatal patient:   Pt presents to visit today with her Mother, Kevon.  Desires breastfeeding. Recommend [facility] classes in 2nd trimester. Order breast pump at 28wks  UTI, last day keflex tomorrow 2025  Phone number provided to call MFM to schedule Nuchal    Interview education  [facility] Pregnancy Essentials reviewed and discussed   [facility] Handout  [facility] Handout  Discussed genetic testing  Prenatal lab work: Scripts printed and given to pt.   Influenza vaccine given today: N/A  Discussed Tdap vaccine.   Immunizations:   Immunization History   Administered Date(s) Administered    COVID-19 PFIZER VACCINE 0.3 ML IM 05/10/2021, 2021    DTaP 5 2004, 2004, 2005, 2005, 04/15/2008    HPV Quadrivalent 2015    HPV9 2019    Hep A, adult 2007, 2008    Hep B, adult 2004, 2004, 2005    Hib (PRP-OMP) 2004, 2004, 2005    IPV 2004, 2004, 2004, 04/15/2008    Influenza, injectable, quadrivalent, preservative free 0.5 mL 2020, 2022    Influenza,  seasonal, injectable 03/16/2015    MMR 03/01/2005, 04/15/2008    Meningococcal MCV4P 04/02/2020    Meningococcal, Unknown Serogroups 03/16/2015    Pneumococcal Conjugate PCV 7 2004, 2004, 07/26/2005    Tdap 03/16/2015    Varicella 07/26/2005, 04/15/2008    influenza, injectable, quadrivalent 02/01/2019     Depression Screening Follow-up Plan: Patient's depression screening was negative with an [place] score of  0  Clinically patient does not have depression. No treatment is required..  Nurse/Family Partnership- referral placed:  Yes   If yes, place referral for nurse family partnership  Tobacco Cessation Counseling: non-smoker  Infection Screening: Does the pt have a hx of MRSA? No  If yes- please follow MRSA protocol and obtain a nasal swab for MRSA culture  The patient was oriented to our practice and all questions were answered.  Interviewed by: abby green RN 07/01/25

## 2025-07-01 NOTE — LETTER
Dentist Letter    Sheila Amaro  2005  [address]  [place] [address]          07/01/25    We have had several requests from local dentist requesting permission to perform procedures on our patients who are pregnant. We wish to respond with this letter regarding some of the more routine procedures that we have been asked about.    The following procedures may be performed on our obstetric patients:   1. Administration of local anesthesia   2. Administration of antibiotics such as PCN, Ampicillin, and Erythromycin.   3. Administration of pain medications such as Tylenol, Tylenol with codeine, and if needed Percocet.   4. Shielded X-rays    Should you have any questions, please do not hesitate to contact at [phone].        Sincerely,    [facility]   [phone]

## 2025-07-01 NOTE — LETTER
Proof of Pregnancy Letter    Sheila Amaro  2005  [address]  [place] [address]        07/01/25      Sheila Amaro is a patient at our facility. Sheila Amaro Estimated Date of Delivery: 1/24/26       Any questions or concerns, please feel free to contact our office.    Sincerely,    [facility]    [address]  [address]   [place] [address]   [phone]

## 2025-07-01 NOTE — LETTER
[facility] Letter       07/01/25       Sheila Amaro  YOB: 2005  [address]  [place] [address]         Sheila Amaro is our patient and under our office's care.  Sheila's Estimated Date of Delivery: 1/24/26    Any questions or concerns feel free to contact our office.           Thank you,   [facility] [place]     [phone]   [facility]/[place]     [phone]        [facility]/[place]    [phone]    [facility]     [phone]        [facility]     [phone]        [facility]     [phone]        [facility]     [phone]        [facility]     [phone]   [place]     [phone]   [place]     [phone]

## 2025-07-01 NOTE — PROGRESS NOTES
BRANDO GUEVARA met with 21 y/o-S- G1-  English speaking woman to complete prenatal assessment. Pt came accompanied by her mom. Pt reported pregnancy was not intended but welcomed. Pt resides with her mom and siblings. Pt reported FOB is supportive. Pt denies any usage of drug, alcohol or smoking. Pt denies any mental health or domestic violence hx. Pt has MA and will call [facility] for appointment.    Pt is employed full time and would like information to get her GED. Information provided via Find help. Pt denies transportation needs. Pt interested on NFP and was referred today. Pt denies any question or concerns. BRANDO GUEVARA explained her role and provided contact information. Pt was encouraged to call as needed.

## 2025-07-12 PROBLEM — Z3A.12 12 WEEKS GESTATION OF PREGNANCY: Status: ACTIVE | Noted: 2025-07-12

## 2025-07-14 ENCOUNTER — TELEPHONE (OUTPATIENT)
Dept: OTHER | Facility: OTHER | Age: 20
End: 2025-07-14

## 2025-07-14 NOTE — TELEPHONE ENCOUNTER
Patient is calling regarding cancelling an appointment.    Date/Time: 7/14 0800    Patient was rescheduled: YES [] NO [x]    Patient requesting call back to reschedule: YES [x] NO []

## 2025-07-15 ENCOUNTER — HOSPITAL ENCOUNTER (EMERGENCY)
Facility: HOSPITAL | Age: 20
Discharge: HOME | End: 2025-07-15
Attending: EMERGENCY MEDICINE | Admitting: EMERGENCY MEDICINE
Payer: COMMERCIAL

## 2025-07-15 ENCOUNTER — TELEPHONE (OUTPATIENT)
Dept: OBGYN CLINIC | Facility: CLINIC | Age: 20
End: 2025-07-15

## 2025-07-15 VITALS
TEMPERATURE: 98 F | OXYGEN SATURATION: 100 % | RESPIRATION RATE: 14 BRPM | SYSTOLIC BLOOD PRESSURE: 108 MMHG | HEART RATE: 78 BPM | DIASTOLIC BLOOD PRESSURE: 60 MMHG

## 2025-07-15 DIAGNOSIS — O46.90 VAGINAL BLEEDING IN PREGNANCY: Primary | ICD-10-CM

## 2025-07-15 DIAGNOSIS — O23.40 UTI (URINARY TRACT INFECTION) DURING PREGNANCY: ICD-10-CM

## 2025-07-15 LAB
ABO GROUP BLD: NORMAL
BILIRUB UR QL STRIP: NEGATIVE
BLD GP AB SCN SERPL QL: NEGATIVE
CLARITY UR: NORMAL
COLOR UR: NORMAL
EXT PREGNANCY TEST URINE: POSITIVE
EXT. CONTROL: ABNORMAL
GLUCOSE UR STRIP-MCNC: NEGATIVE MG/DL
HGB UR QL STRIP.AUTO: NEGATIVE
KETONES UR STRIP-MCNC: NEGATIVE MG/DL
LEUKOCYTE ESTERASE UR QL STRIP: NEGATIVE
NITRITE UR QL STRIP: NEGATIVE
PH UR STRIP.AUTO: 7 [PH]
PROT UR STRIP-MCNC: NEGATIVE MG/DL
RH BLD: POSITIVE
SP GR UR STRIP.AUTO: 1.02 (ref 1–1.03)
SPECIMEN EXPIRATION DATE: NORMAL
UROBILINOGEN UR STRIP-ACNC: <2 MG/DL

## 2025-07-15 PROCEDURE — 76815 OB US LIMITED FETUS(S): CPT | Performed by: EMERGENCY MEDICINE

## 2025-07-15 PROCEDURE — 86850 RBC ANTIBODY SCREEN: CPT

## 2025-07-15 PROCEDURE — 87077 CULTURE AEROBIC IDENTIFY: CPT

## 2025-07-15 PROCEDURE — 87186 SC STD MICRODIL/AGAR DIL: CPT

## 2025-07-15 PROCEDURE — 87086 URINE CULTURE/COLONY COUNT: CPT

## 2025-07-15 PROCEDURE — 86900 BLOOD TYPING SEROLOGIC ABO: CPT

## 2025-07-15 PROCEDURE — 99284 EMERGENCY DEPT VISIT MOD MDM: CPT | Performed by: EMERGENCY MEDICINE

## 2025-07-15 PROCEDURE — 86901 BLOOD TYPING SEROLOGIC RH(D): CPT

## 2025-07-15 PROCEDURE — 81025 URINE PREGNANCY TEST: CPT

## 2025-07-15 PROCEDURE — 36415 COLL VENOUS BLD VENIPUNCTURE: CPT

## 2025-07-15 PROCEDURE — 81003 URINALYSIS AUTO W/O SCOPE: CPT

## 2025-07-15 PROCEDURE — 99283 EMERGENCY DEPT VISIT LOW MDM: CPT

## 2025-07-15 NOTE — ED PROVIDER NOTES
Time reflects when diagnosis was documented in both MDM as applicable and the Disposition within this note       Time User Action Codes Description Comment    7/15/2025  4:41 PM Deandre Kim Add [O46.90] Vaginal bleeding in pregnancy     7/16/2025  5:40 PM Alondra Morrissey Add [O23.40] UTI (urinary tract infection) during pregnancy           ED Disposition       ED Disposition   Discharge    Condition   Stable    Date/Time   Tue Jul 15, 2025  4:40 PM    Comment   Yoandyyareli Amaro discharge to home/self care.                   Assessment & Plan       Medical Decision Making  Pregnant female who presented to the emergency department for vaginal bleeding versus hematuria.  On physical examination, patient had no acute findings.  While in the emergency department patient's labs showed no acute concerns.  Patient's bedside ultrasound showed single gestation IUP with fetal heart rate of 167 bpm.  Patient was plan for discharge and advised follow-up outpatient with PCP and OB/GYN.  She was instructed to return the emergency department if her symptoms worsen.  Patient was agreeable to plan.    Differential diagnoses include but not limited to vaginal bleeding in pregnancy, threatened miscarriage, urinary tract infection.    Amount and/or Complexity of Data Reviewed  Labs: ordered. Decision-making details documented in ED Course.        ED Course as of 07/19/25 0151   Tue Jul 15, 2025   1531 PREGNANCY TEST URINE(!): Positive   1640 Bedside pelvic ultrasound was performed showing a fetal heart rate of 167 bpm.   1645 ABO Grouping: O   1645 Rh Factor: Positive       Medications - No data to display    ED Risk Strat Scores                    No data recorded                            History of Present Illness       Chief Complaint   Patient presents with    Blood in Urine     Pt saw blood in urine x 1 today. Pt is 12 wks preg..  feels ok now       Past Medical History[1]   Past Surgical History[2]   Family History[3]   Social  History[4]   E-Cigarette/Vaping    E-Cigarette Use Never User       E-Cigarette/Vaping Substances    Nicotine No     THC No     CBD No     Flavoring No     Other No     Unknown No       I have reviewed and agree with the history as documented.     20-year-old pregnant female at 12 w 3 d  with no significant PMH who presents emergency department for vaginal bleeding versus hematuria.  Patient states that this morning when using the bathroom she noticed that there was red on the toilet paper as well as in the toilet.  She states that she is unsure if the bleeding is coming from her vagina or her urethra.  Patient's most recent outpatient ultrasound of her baby in  showed no acute concerns and she has been taking her prenatal vitamins.  Patient denies any other symptoms at this time.  Denies any urinary symptoms.  No other acute concerns. Denies chest pain, SOB, cough, abdominal pain, n/v/d, fever, chills, dizziness, lightheadedness, HA, dysuria, hematuria, hematochezia, or melena.               Review of Systems   Constitutional:  Negative for appetite change, chills, diaphoresis, fatigue and fever.   HENT:  Negative for congestion, ear pain, postnasal drip, rhinorrhea, sore throat and trouble swallowing.    Eyes:  Negative for pain and visual disturbance.   Respiratory:  Negative for cough and shortness of breath.    Cardiovascular:  Negative for chest pain and palpitations.   Gastrointestinal:  Negative for abdominal pain, constipation, diarrhea, nausea and vomiting.   Genitourinary:  Negative for decreased urine volume and dysuria.   Musculoskeletal:  Negative for arthralgias and back pain.   Skin:  Negative for color change and rash.   Neurological:  Negative for dizziness, seizures, syncope, weakness, light-headedness and headaches.   All other systems reviewed and are negative.          Objective       ED Triage Vitals   Temperature Pulse Blood Pressure Respirations SpO2 Patient Position - Orthostatic  VS   07/15/25 1402 07/15/25 1402 07/15/25 1402 07/15/25 1402 07/15/25 1402 07/15/25 1402   97.7 °F (36.5 °C) 82 120/58 16 98 % Sitting      Temp Source Heart Rate Source BP Location FiO2 (%) Pain Score    07/15/25 1402 07/15/25 1653 07/15/25 1653 -- 07/15/25 1402    Oral Monitor Right arm  No Pain      Vitals      Date and Time Temp Pulse SpO2 Resp BP Pain Score FACES Pain Rating User   07/15/25 1653 98 °F (36.7 °C) 78 100 % 14 108/60 No Pain -- AN   07/15/25 1402 97.7 °F (36.5 °C) 82 98 % 16 120/58 No Pain -- RLN            Physical Exam  Vitals and nursing note reviewed.   Constitutional:       General: She is not in acute distress.     Appearance: Normal appearance. She is normal weight.   HENT:      Head: Normocephalic and atraumatic.      Right Ear: External ear normal.      Left Ear: External ear normal.      Nose: Nose normal.      Mouth/Throat:      Pharynx: Oropharynx is clear.     Eyes:      Conjunctiva/sclera: Conjunctivae normal.       Cardiovascular:      Rate and Rhythm: Normal rate and regular rhythm.      Pulses: Normal pulses.      Heart sounds: Normal heart sounds.      Comments: RRR with +S1 and S2, no murmurs appreciated on exam. Peripheral pulses intact.    Pulmonary:      Effort: Pulmonary effort is normal. No respiratory distress.      Breath sounds: Normal breath sounds. No wheezing, rhonchi or rales.      Comments: CTABL with no abnormal lung sounds such as wheezes or rales appreciated on exam.     Abdominal:      General: Abdomen is flat. Bowel sounds are normal. There is no distension.      Palpations: Abdomen is soft.      Tenderness: There is no abdominal tenderness.      Comments: Soft, non tender, normo-active bowel sounds. Without rigidity, guarding, or distension.       Musculoskeletal:         General: Normal range of motion.      Cervical back: Normal range of motion.     Skin:     General: Skin is warm and dry.     Neurological:      General: No focal deficit present.      Mental  Status: She is alert and oriented to person, place, and time. Mental status is at baseline.      Comments: CN grossly intact on visualization. No focal neurologic deficits noted on exam.  5/5 strength in all extremities. Neurovascularly intact with normal sensation and motor function.           Results Reviewed       Procedure Component Value Units Date/Time    Urine culture [[id number]]  (Abnormal)  (Susceptibility) Collected: 07/15/25 1436    Lab Status: Final result Specimen: Urine, Clean Catch Updated: 07/17/25 2110     Urine Culture 10,000-19,000 cfu/ml Enterococcus faecalis    Susceptibility       Enterococcus faecalis (1)       Antibiotic Interpretation Microscan   Method Status    ZID Performed  Yes  SONIA Final    Ampicillin ($$) Susceptible <=2.00 ug/ml SONIA Final    Levofloxacin ($) Susceptible <=1.00 ug/ml SONIA Final    Nitrofurantoin Susceptible <=32 ug/ml SONIA Final    Tetracycline Resistant >8 ug/ml SONIA Final    Vancomycin ($) Susceptible 1.00 ug/ml SONIA Final                       UA w Reflex to Microscopic w Reflex to Culture [[id number]] Collected: 07/15/25 1436    Lab Status: Final result Specimen: Urine, Clean Catch Updated: 07/15/25 1446     Color, UA Light Yellow     Clarity, UA Turbid     Specific Gravity, UA 1.025     pH, UA 7.0     Leukocytes, UA Negative     Nitrite, UA Negative     Protein, UA Negative mg/dl      Glucose, UA Negative mg/dl      Ketones, UA Negative mg/dl      Urobilinogen, UA <2.0 mg/dl      Bilirubin, UA Negative     Occult Blood, UA Negative     URINE COMMENT --    POCT pregnancy, urine [[id number]]  (Abnormal) Collected: 07/15/25 1442    Lab Status: Final result Updated: 07/15/25 1442     EXT Preg Test, Ur Positive     Control Valid            No orders to display       POC Pelvic US    Date/Time: 7/15/2025 4:35 PM    Performed by: Deandre Kim MD  Authorized by: Deandre Kim MD    Patient location:  ED  Procedure details:     Exam Type:  Diagnostic    Indications: pregnant with  vaginal bleeding      Assessment for: evaluate fetal viability      Technique:  Transabdominal obstetric (HCG+) exam    Views obtained: uterus (transverse and sagittal)      Image quality: diagnostic      Image availability:  Images available in PACS  Uterine findings:     Single gestation: identified      Fetal pole: identified      Fetal heart rate: identified      Fetal heart rate (bpm):  167  Right adnexa findings:     Right ovary:  Not visualized  Left adnexa findings:     Left ovary:  Not visualized  Other findings:     Free pelvic fluid: not identified      Free peritoneal fluid: not identified    Interpretation:     Ultrasound impressions: normal      Pregnancy findings: intrauterine pregnancy (IUP)        ED Medication and Procedure Management   Prior to Admission Medications   Prescriptions Last Dose Informant Patient Reported? Taking?   Prenatal Vit-Fe Fumarate-FA (prenatal multivitamin) 28-0.8 MG TABS  Self Yes No   Sig: Take 1 tablet by mouth daily   albuterol (PROVENTIL HFA,VENTOLIN HFA) 90 mcg/act inhaler   No No   Sig: Inhale 2 puffs every 6 (six) hours as needed for wheezing or shortness of breath With spacer   Patient not taking: Reported on 2025   doxylamine (UNISOM) 25 MG tablet   No No   Sig: Take 0.5 tablets (12.5 mg total) by mouth daily at bedtime as needed for nausea   Patient not taking: Reported on 2025   famotidine (PEPCID) 20 mg tablet  Self No No   Sig: Take 1 tablet (20 mg total) by mouth in the morning.   fluticasone (FLONASE) 50 mcg/act nasal spray   No No   Si sprays into each nostril daily   Patient not taking: Reported on 2025   ondansetron (ZOFRAN) 4 mg tablet   No No   Sig: Take 1 tablet (4 mg total) by mouth every 6 (six) hours   Patient not taking: Reported on 2025   pyridoxine (B-6) 25 MG tablet  Self No No   Sig: Take 1 tablet (25 mg total) by mouth daily      Facility-Administered Medications: None     Discharge Medication List as of 7/15/2025  4:42  PM        CONTINUE these medications which have NOT CHANGED    Details   albuterol (PROVENTIL HFA,VENTOLIN HFA) 90 mcg/act inhaler Inhale 2 puffs every 6 (six) hours as needed for wheezing or shortness of breath With spacer, Starting Sat 12/23/2023, Normal      doxylamine (UNISOM) 25 MG tablet Take 0.5 tablets (12.5 mg total) by mouth daily at bedtime as needed for nausea, Starting Mon 6/16/2025, Normal      famotidine (PEPCID) 20 mg tablet Take 1 tablet (20 mg total) by mouth in the morning., Starting Mon 6/16/2025, Normal      fluticasone (FLONASE) 50 mcg/act nasal spray 2 sprays into each nostril daily, Starting Mon 4/5/2021, Normal      ondansetron (ZOFRAN) 4 mg tablet Take 1 tablet (4 mg total) by mouth every 6 (six) hours, Starting Tue 12/20/2022, Normal      Prenatal Vit-Fe Fumarate-FA (prenatal multivitamin) 28-0.8 MG TABS Take 1 tablet by mouth daily, Historical Med      pyridoxine (B-6) 25 MG tablet Take 1 tablet (25 mg total) by mouth daily, Starting Mon 6/16/2025, Until Fri 8/15/2025, Normal           No discharge procedures on file.  ED SEPSIS DOCUMENTATION   Time reflects when diagnosis was documented in both MDM as applicable and the Disposition within this note       Time User Action Codes Description Comment    7/15/2025  4:41 PM Deandre Kim Add [O46.90] Vaginal bleeding in pregnancy     7/16/2025  5:40 PM Alondra Morrissey Add [O23.40] UTI (urinary tract infection) during pregnancy                    [1]   Past Medical History:  Diagnosis Date    Asthma    [2]   Past Surgical History:  Procedure Laterality Date    APPENDECTOMY      8-9 years of age    [3]   Family History  Problem Relation Name Age of Onset    Asthma Mother      No Known Problems Father      No Known Problems Brother      No Known Problems Brother      No Known Problems Brother      No Known Problems Brother      Diabetes Maternal Grandmother      Hypertension Maternal Grandmother      Asthma Maternal Grandmother      Arthritis  Maternal Grandmother      Multiple sclerosis Maternal Grandmother      No Known Problems Maternal Grandfather      No Known Problems Paternal Grandmother      No Known Problems Paternal Grandfather     [4]   Social History  Tobacco Use    Smoking status: Never    Smokeless tobacco: Never   Vaping Use    Vaping status: Never Used   Substance Use Topics    Alcohol use: Never    Drug use: Never        Deandre Kim MD  07/19/25 0152     Hemostasis: Electrodesiccation

## 2025-07-15 NOTE — ED ATTENDING ATTESTATION
7/15/2025  I, Jonnie Mackey MD, saw and evaluated the patient. I have discussed the patient with the resident/non-physician practitioner and agree with the resident's/non-physician practitioner's findings, Plan of Care, and MDM as documented in the resident's/non-physician practitioner's note, except where noted. All available labs and Radiology studies were reviewed.  I was present for key portions of any procedure(s) performed by the resident/non-physician practitioner and I was immediately available to provide assistance.       At this point I agree with the current assessment done in the Emergency Department.  I have conducted an independent evaluation of this patient a history and physical is as follows:    S:  Chief Complaint   Patient presents with    Blood in Urine     Pt saw blood in urine x 1 today. Pt is 12 wks preg..  feels ok now     Sheila is a  20 y.o. female who is about 12 weeks pregnant who noted some blood on toilet tissue this morning.  No abdominal pain.  No continued bleeding.      O:  ED Triage Vitals   Temperature Pulse Respirations Blood Pressure SpO2   07/15/25 1402 07/15/25 1402 07/15/25 1402 07/15/25 1402 07/15/25 1402   97.7 °F (36.5 °C) 82 16 120/58 98 %      Temp Source Heart Rate Source Patient Position - Orthostatic VS BP Location FiO2 (%)   07/15/25 1402 07/15/25 1653 07/15/25 1402 07/15/25 1653 --   Oral Monitor Sitting Right arm       Pain Score       07/15/25 1402       No Pain         Physical Exam  Vitals and nursing note reviewed.   Constitutional:       General: She is not in acute distress.     Appearance: She is well-developed.   HENT:      Head: Normocephalic and atraumatic.     Eyes:      Extraocular Movements: Extraocular movements intact.      Pupils: Pupils are equal, round, and reactive to light.     Neck:      Vascular: No JVD.     Cardiovascular:      Rate and Rhythm: Normal rate and regular rhythm.      Heart sounds: Normal heart sounds. No murmur  heard.     No friction rub. No gallop.   Pulmonary:      Effort: Pulmonary effort is normal. No respiratory distress.      Breath sounds: Normal breath sounds. No wheezing or rales.   Chest:      Chest wall: No tenderness.   Abdominal:      General: There is no distension.      Tenderness: There is no abdominal tenderness. There is no guarding.     Musculoskeletal:         General: No tenderness. Normal range of motion.      Cervical back: Normal range of motion.     Skin:     General: Skin is warm and dry.     Neurological:      General: No focal deficit present.      Mental Status: She is alert and oriented to person, place, and time.     Psychiatric:         Behavior: Behavior normal.         Thought Content: Thought content normal.         Judgment: Judgment normal.       A/P:  Background: 1st trimester bleeding and abdominal/pelvic pain    Differential DX includes but is not limited to: threatened vs. Complete miscarriage, sub- chorionic hemorrhage, mild cervical bleeding, possible uti    Plan: patient is O + per records, had an ultrasound which identified gestational sac but no FHR in May, will perform bedside ultrasound today, will check ua as well        ED Course     Labs Reviewed   POCT PREGNANCY, URINE - Abnormal       Result Value Ref Range Status    EXT Preg Test, Ur Positive (*) Negative Final    Control Valid  Valid Final   UA W REFLEX TO MICROSCOPIC WITH REFLEX TO CULTURE    Color, UA Light Yellow   Final    Clarity, UA Turbid   Final    Specific Gravity, UA 1.025  1.003 - 1.030 Final    pH, UA 7.0  4.5, 5.0, 5.5, 6.0, 6.5, 7.0, 7.5, 8.0 Final    Leukocytes, UA Negative  Negative Final    Nitrite, UA Negative  Negative Final    Protein, UA Negative  Negative mg/dl Final    Glucose, UA Negative  Negative mg/dl Final    Ketones, UA Negative  Negative mg/dl Final    Urobilinogen, UA <2.0  <2.0 mg/dl mg/dl Final    Bilirubin, UA Negative  Negative Final    Occult Blood, UA Negative  Negative Final     URINE COMMENT     Final    Comment: Pt is pregnant. Urine Culture ordered.   TYPE AND SCREEN    ABO Grouping O   Final    Rh Factor Positive   Final    Antibody Screen Negative   Final    Specimen Expiration Date 20250718   Final         Critical Care Time  Procedures    Time reflects when diagnosis was documented in both MDM as applicable and the Disposition within this note       Time User Action Codes Description Comment    7/15/2025  4:41 PM Deandre Kim Add [O46.90] Vaginal bleeding in pregnancy     7/16/2025  5:40 PM Alondra Morrissey Add [O23.40] UTI (urinary tract infection) during pregnancy           ED Disposition       ED Disposition   Discharge    Condition   Stable    Date/Time   Tue Jul 15, 2025  4:40 PM    Comment   Sheila Amaro discharge to home/self care.                   Follow-up Information       Follow up With Specialties Details Why Contact Info Additional Information    [facility] Obstetrics and Gynecology Call  As needed [address] [facility] [address] [phone] [facility], [address] [place], [place], [address] [phone]

## 2025-07-16 ENCOUNTER — ROUTINE PRENATAL (OUTPATIENT)
Dept: OBGYN CLINIC | Facility: CLINIC | Age: 20
End: 2025-07-16

## 2025-07-16 VITALS
DIASTOLIC BLOOD PRESSURE: 70 MMHG | HEIGHT: 62 IN | WEIGHT: 128.4 LBS | HEART RATE: 88 BPM | BODY MASS INDEX: 23.63 KG/M2 | SYSTOLIC BLOOD PRESSURE: 103 MMHG

## 2025-07-16 DIAGNOSIS — Z34.01 PRENATAL CARE, FIRST PREGNANCY IN FIRST TRIMESTER: ICD-10-CM

## 2025-07-16 DIAGNOSIS — Z3A.12 12 WEEKS GESTATION OF PREGNANCY: Primary | ICD-10-CM

## 2025-07-16 PROCEDURE — 87491 CHLMYD TRACH DNA AMP PROBE: CPT | Performed by: NURSE PRACTITIONER

## 2025-07-16 PROCEDURE — 87591 N.GONORRHOEAE DNA AMP PROB: CPT | Performed by: NURSE PRACTITIONER

## 2025-07-16 PROCEDURE — 99213 OFFICE O/P EST LOW 20 MIN: CPT | Performed by: NURSE PRACTITIONER

## 2025-07-16 PROCEDURE — 88175 CYTOPATH C/V AUTO FLUID REDO: CPT | Performed by: NURSE PRACTITIONER

## 2025-07-16 RX ORDER — CEPHALEXIN 500 MG/1
500 CAPSULE ORAL EVERY 12 HOURS SCHEDULED
Qty: 10 CAPSULE | Refills: 0 | Status: SHIPPED | OUTPATIENT
Start: 2025-07-16 | End: 2025-07-21

## 2025-07-16 NOTE — PROGRESS NOTES
"Prenatal H&P   [facility]    Subjective:  Patient is a  here for initial prenatal H&P. This is an intended pregnancy. Patient reports that her LMP was 25. Patient is currently doing well. She is here alone today. She currently works at a . She has a good support system at home, she lives with her mom and siblings. She does not have a known family history of inheritable conditions such as physical or intellectual disabilities, birth defects, blood disorders, heart or neural tube defects. She denies recent travel. She denies use of nicotine, EtOH or recreational drug use.    OB History    Para Term  AB Living   1 0 0 0 0 0   SAB IAB Ectopic Multiple Live Births   0 0 0 0 0      # Outcome Date GA Lbr Jace/2nd Weight Sex Type Anes PTL Lv   1 Current                  Objective:   /70 (BP Location: Right arm, Patient Position: Sitting)   Pulse 88   Ht 5' 2\" (1.575 m)   Wt 58.2 kg (128 lb 6.4 oz)   LMP 2025 (Approximate)   BMI 23.48 kg/m²     General:   alert and oriented, in no acute distress, appears stated age, and cooperative   Heart: regular rate and rhythm, S1, S2 normal, no murmur, click, rub or gallop   Lungs: clear to auscultation bilaterally   Abdomen: soft, non-tender, without masses or organomegaly   Vulva: normal   Vagina: normal mucosa, normal discharge   Cervix: no lesions and nulliparous appearance   Uterus: size consistent with 12 weeks   Adnexa: normal adnexa         Assessment and Plan:  1. LMP 26 with an SIRISHA 26.   2. TVUS on 25 showed EGA 8w3d with an SIRISHA consistent with LMP   3. Pap smear collected.  4. GC/CT collected.  5. Prenatal labs complete.  6. Postpartum: patient plans on  breastfeeding. Different methods of contraception were discussed with patient, including progesterone only oral pills, depo provera, nexplanon, mirena, and paragard. Unsure of contraception plan  7. Delivery consent form to be signed at 28 " weeks.  8. Considering NIPS, scheduled for NT scan with MFM next week.   9. Labor expectations discussed with patient, including appointment schedule, nutrition, weight gain, sexual intercourse, and nausea and vomiting.   10. RTC in 4 weeks. Continue PNV QD.

## 2025-07-17 LAB — BACTERIA UR CULT: ABNORMAL

## 2025-07-18 LAB
C TRACH DNA SPEC QL NAA+PROBE: NEGATIVE
N GONORRHOEA DNA SPEC QL NAA+PROBE: NEGATIVE

## 2025-07-20 ENCOUNTER — RESULTS FOLLOW-UP (OUTPATIENT)
Dept: OBGYN CLINIC | Facility: CLINIC | Age: 20
End: 2025-07-20

## 2025-07-21 NOTE — PATIENT INSTRUCTIONS
You elected to have non-invasive prenatal screening (NIPS). This involves a blood test to check for the four most common genetic syndromes (Trisomy 21, Trisomy 13, Trisomy 18, and sex chromosome abnormalities). It also MAY report the biologic sex of the fetus if you opted to learn this information. You can call our office to verbally review results to avoid inadvertently learning this information via [employer], if desired. Results will be visible in your [employer] portal 5-7 business days from when the test is drawn. Please follow all instructions regarding insurance cost/coverage provided to you today. Please contact our office with any concerns or questions. You will need spina bifida screening (called MSAFP) for the baby beginning at 15 weeks gestation, which will be ordered by your obstetrician's office. This test allows for earlier detection of spina bifida than is possible by ultrasound, and is advised in all pregnancies.        Thank you for choosing us for your  care today.  If you have any questions about your ultrasound or care, please do not hesitate to contact us or your primary obstetrician.        Some general instructions for your pregnancy are:    Exercise: Aim for 150 minutes per week of regular exercise.  Walking is great!  Nutrition: Choose healthy sources of calcium, iron, and protein.  Avoid ultraprocessed foods and added sugar.  Learn about Preeclampsia: preeclampsia is a common, potentially serious high blood pressure complication in pregnancy.  A blood pressure of 140mmHg (systolic or top number) or 90mmHg (diastolic or bottom number) should be evaluated by your doctor.  Aspirin is sometimes prescribed in early pregnancy to prevent preeclampsia in women with risk factors - ask your obstetrician if you should be on this medication.  For more resources, visit:  [email or web address].[email or web address]  If you smoke, please try to quit completely but also try to reduce your  smoking by as much as possible (as soon as possible).  Do not vape.  Please also avoid cannabis products.  Other warning signs to watch out for in pregnancy or postpartum: chest pain, obstructed breathing or shortness of breath, seizures, thoughts of hurting yourself or your baby, bleeding, a painful or swollen leg, fever, or headache (see [facility] POST-BIRTH Warning Signs campaign).  If these happen call 911.  Itching is also not normal in pregnancy and if you experience this, especially over your hands and feet, potentially worse at night, notify your doctors.

## 2025-07-21 NOTE — TELEPHONE ENCOUNTER
Called patient and left a  with office number to return call, please provide patient with results.    ----- Message from EFRAIN Mackey sent at 7/20/2025  6:31 PM EDT -----  Please inform pt that her culture for chlamydia and gonorrhea were negative.  Thank You!   ----- Message -----  From: Lab, Background User  Sent: 7/18/2025  11:19 AM EDT  To: EFRAIN Nolasco

## 2025-07-22 ENCOUNTER — TELEPHONE (OUTPATIENT)
Dept: OBGYN CLINIC | Facility: CLINIC | Age: 20
End: 2025-07-22

## 2025-07-22 ENCOUNTER — ANCILLARY PROCEDURE (OUTPATIENT)
Dept: PERINATAL CARE | Facility: CLINIC | Age: 20
End: 2025-07-22
Payer: COMMERCIAL

## 2025-07-22 ENCOUNTER — ROUTINE PRENATAL (OUTPATIENT)
Dept: PERINATAL CARE | Facility: CLINIC | Age: 20
End: 2025-07-22
Payer: COMMERCIAL

## 2025-07-22 VITALS
DIASTOLIC BLOOD PRESSURE: 58 MMHG | HEART RATE: 84 BPM | WEIGHT: 130.2 LBS | BODY MASS INDEX: 23.96 KG/M2 | SYSTOLIC BLOOD PRESSURE: 102 MMHG | OXYGEN SATURATION: 98 % | HEIGHT: 62 IN

## 2025-07-22 DIAGNOSIS — Z32.01 POSITIVE PREGNANCY TEST: ICD-10-CM

## 2025-07-22 DIAGNOSIS — Z3A.13 13 WEEKS GESTATION OF PREGNANCY: ICD-10-CM

## 2025-07-22 DIAGNOSIS — Z36.0 ENCOUNTER FOR ANTENATAL SCREENING FOR CHROMOSOMAL ANOMALIES: Primary | ICD-10-CM

## 2025-07-22 LAB
LAB AP GYN PRIMARY INTERPRETATION: NORMAL
LAB AP LMP: NORMAL
Lab: NORMAL
PATH INTERP SPEC-IMP: NORMAL

## 2025-07-22 PROCEDURE — 76813 OB US NUCHAL MEAS 1 GEST: CPT | Performed by: STUDENT IN AN ORGANIZED HEALTH CARE EDUCATION/TRAINING PROGRAM

## 2025-07-22 PROCEDURE — 99243 OFF/OP CNSLTJ NEW/EST LOW 30: CPT | Performed by: STUDENT IN AN ORGANIZED HEALTH CARE EDUCATION/TRAINING PROGRAM

## 2025-07-22 NOTE — TELEPHONE ENCOUNTER
LM , requesting pt call office to discuss results and schedule a follow up appointment for further testing. Office number provided.

## 2025-07-22 NOTE — LETTER
2025     Liat Kumar MD  [address]  [address]  [facility] [address]    Patient: Sheila Amaro   YOB: 2005   Date of Visit: 2025       Dear Dr. Liat Kumar MD:    Thank you for referring Sheila Amaro to me for evaluation. Below are my notes for this consultation.    If you have questions, please do not hesitate to call me. I look forward to following your patient along with you.         Sincerely,        Liat Ojeda MD        CC: No Recipients    Liat Ojeda MD  2025  9:17 AM  Sign when Signing Visit  CONSULTATION: MATERNAL-FETAL MEDICINE  Name: Sheila Amaro      : 2005      MRN: [id number]  Encounter Provider: Liat Ojeda MD  Encounter Date: 2025   Encounter department: [facility]  :  Assessment & Plan  13 weeks gestation of pregnancy  We reviewed the availability of genetic screening, as well as diagnostic testing, which are available to all pregnant women. We reviewed limitations, risks, and benefits of screening and testing. She elected to proceed with Non Invasive Prenatal Screening (NIPS). MSAFP screening should be ordered through your office at 15-20 weeks gestation, and completed prior to fetal anatomic survey. She does not wish to pursue diagnostic testing at this time.     A detailed anatomic survey as well as transvaginal cervical length screening are recommended between 20-22 weeks gestation.          Positive pregnancy test    Orders:  •  Ambulatory Referral to Maternal Fetal Medicine    Encounter for  screening for chromosomal anomalies    Orders:  •  [id number] PLUS Core+SCA        History of Present Illness    Sheila is a 20 y.o.  at 13w3d presenting for consultation for aneuploidy screening. She reports no obstetric complaints today.    Her Antepartum course is uncomplicated.She has well-controlled asthma not requiring medication and without recent flare.    Past  "Medical History  OB History    Para Term  AB Living   1 0 0 0 0 0   SAB IAB Ectopic Multiple Live Births   0 0 0 0 0      # Outcome Date GA Lbr Jace/2nd Weight Sex Type Anes PTL Lv   1 Current              Past Medical History[1]  Past Surgical History[2]    Social History:   She denies current use of alcohol, drugs of abuse, tobacco, and marijuana products.     Family History:  Family history was reviewed using an office screening tool, and is negative for congenital anomalies, genetic diseases, and thromboembolism in first degree relatives of this pregnancy.     Current Medications[3]  Allergies:   Allergies   Allergen Reactions   • Seasonal Ic [Cholestatin] Eye Swelling and Nasal Congestion         Objective  /58 (BP Location: Right arm, Patient Position: Sitting, Cuff Size: Standard)   Pulse 84   Ht 5' 2\" (1.575 m)   Wt 59.1 kg (130 lb 3.2 oz)   LMP 2025 (Approximate)   SpO2 98%   BMI 23.81 kg/m²      Patient's last menstrual period was 2025 (approximate).  Estimated Delivery Date: 2026, by Last Menstrual Period    General appearance: alert, well appearing, and in no distress.  The remainder of her physical examination was deferred as she was here today for consultation and discussion.    Please refer to \"Imaging\" for ultrasound report from today's visit.          [1]   Past Medical History:  Diagnosis Date   • Asthma    [2]   Past Surgical History:  Procedure Laterality Date   • APPENDECTOMY      8-9 years of age    [3]   Current Outpatient Medications:   •  albuterol (PROVENTIL HFA,VENTOLIN HFA) 90 mcg/act inhaler, Inhale 2 puffs every 6 (six) hours as needed for wheezing or shortness of breath With spacer, Disp: 8 g, Rfl: 6  •  famotidine (PEPCID) 20 mg tablet, Take 1 tablet (20 mg total) by mouth in the morning., Disp: 30 tablet, Rfl: 1  •  Prenatal Vit-Fe Fumarate-FA (prenatal multivitamin) 28-0.8 MG TABS, Take 1 tablet by mouth daily, Disp: , Rfl:   •  pyridoxine " (B-6) 25 MG tablet, Take 1 tablet (25 mg total) by mouth daily, Disp: 30 tablet, Rfl: 1  •  doxylamine (UNISOM) 25 MG tablet, Take 0.5 tablets (12.5 mg total) by mouth daily at bedtime as needed for nausea (Patient not taking: Reported on 7/22/2025), Disp: 30 tablet, Rfl: 0  •  fluticasone (FLONASE) 50 mcg/act nasal spray, 2 sprays into each nostril daily (Patient not taking: Reported on 4/27/2022), Disp: 1 Bottle, Rfl: 2  •  ondansetron (ZOFRAN) 4 mg tablet, Take 1 tablet (4 mg total) by mouth every 6 (six) hours (Patient not taking: Reported on 6/16/2025), Disp: 12 tablet, Rfl: 0

## 2025-07-22 NOTE — PROGRESS NOTES
CONSULTATION: MATERNAL-FETAL MEDICINE  Name: Sheila Amaro      : 2005      MRN: [id number]  Encounter Provider: Liat Ojeda MD  Encounter Date: 2025   [facility]  :  Assessment & Plan  13 weeks gestation of pregnancy  We reviewed the availability of genetic screening, as well as diagnostic testing, which are available to all pregnant women. We reviewed limitations, risks, and benefits of screening and testing. She elected to proceed with Non Invasive Prenatal Screening (NIPS). MSAFP screening should be ordered through your office at 15-20 weeks gestation, and completed prior to fetal anatomic survey. She does not wish to pursue diagnostic testing at this time.     A detailed anatomic survey as well as transvaginal cervical length screening are recommended between 20-22 weeks gestation.          Positive pregnancy test    Orders:    Ambulatory Referral to Maternal Fetal Medicine    Encounter for  screening for chromosomal anomalies    Orders:    [id number] PLUS Core+SCA        History of Present Illness     Sheila is a 20 y.o.  at 13w3d presenting for consultation for aneuploidy screening. She reports no obstetric complaints today.    Her Antepartum course is uncomplicated.She has well-controlled asthma not requiring medication and without recent flare.    Past Medical History   OB History    Para Term  AB Living   1 0 0 0 0 0   SAB IAB Ectopic Multiple Live Births   0 0 0 0 0      # Outcome Date GA Lbr Jace/2nd Weight Sex Type Anes PTL Lv   1 Current              Past Medical History[1]  Past Surgical History[2]    Social History:   She denies current use of alcohol, drugs of abuse, tobacco, and marijuana products.     Family History:  Family history was reviewed using an office screening tool, and is negative for congenital anomalies, genetic diseases, and thromboembolism in first degree relatives of this  "pregnancy.     Current Medications[3]  Allergies:   Allergies   Allergen Reactions    Seasonal Ic [Cholestatin] Eye Swelling and Nasal Congestion         Objective   /58 (BP Location: Right arm, Patient Position: Sitting, Cuff Size: Standard)   Pulse 84   Ht 5' 2\" (1.575 m)   Wt 59.1 kg (130 lb 3.2 oz)   LMP 04/19/2025 (Approximate)   SpO2 98%   BMI 23.81 kg/m²      Patient's last menstrual period was 04/19/2025 (approximate).  Estimated Delivery Date: 1/24/2026, by Last Menstrual Period    General appearance: alert, well appearing, and in no distress.  The remainder of her physical examination was deferred as she was here today for consultation and discussion.    Please refer to \"Imaging\" for ultrasound report from today's visit.          [1]   Past Medical History:  Diagnosis Date    Asthma    [2]   Past Surgical History:  Procedure Laterality Date    APPENDECTOMY      8-9 years of age    [3]   Current Outpatient Medications:     albuterol (PROVENTIL HFA,VENTOLIN HFA) 90 mcg/act inhaler, Inhale 2 puffs every 6 (six) hours as needed for wheezing or shortness of breath With spacer, Disp: 8 g, Rfl: 6    famotidine (PEPCID) 20 mg tablet, Take 1 tablet (20 mg total) by mouth in the morning., Disp: 30 tablet, Rfl: 1    Prenatal Vit-Fe Fumarate-FA (prenatal multivitamin) 28-0.8 MG TABS, Take 1 tablet by mouth daily, Disp: , Rfl:     pyridoxine (B-6) 25 MG tablet, Take 1 tablet (25 mg total) by mouth daily, Disp: 30 tablet, Rfl: 1    doxylamine (UNISOM) 25 MG tablet, Take 0.5 tablets (12.5 mg total) by mouth daily at bedtime as needed for nausea (Patient not taking: Reported on 7/22/2025), Disp: 30 tablet, Rfl: 0    fluticasone (FLONASE) 50 mcg/act nasal spray, 2 sprays into each nostril daily (Patient not taking: Reported on 4/27/2022), Disp: 1 Bottle, Rfl: 2    ondansetron (ZOFRAN) 4 mg tablet, Take 1 tablet (4 mg total) by mouth every 6 (six) hours (Patient not taking: Reported on 6/16/2025), Disp: 12 " tablet, Rfl: 0

## 2025-07-27 LAB
CFDNA.FET/CFDNA.TOTAL SFR FETUS: NORMAL %
CFDNA.FET/CFDNA.TOTAL SFR FETUS: NORMAL %
CITATION REF LAB TEST: NORMAL
FET 13+18+21+X+Y ANEUP PLAS.CFDNA: NEGATIVE
FET CHR 21 TS PLAS.CFDNA QL: NEGATIVE
FET CHR 21 TS PLAS.CFDNA QL: NEGATIVE
FET MS X RISK WBC.DNA+CFDNA QL: NOT DETECTED
FET SEX PLAS.CFDNA DOSAGE CFDNA: NORMAL
FET TS 13 RISK PLAS.CFDNA QL: NEGATIVE
FET X + Y ANEUP RISK PLAS.CFDNA SEQ-IMP: NOT DETECTED
GA EST FROM CONCEPTION DATE: NORMAL D
GESTATIONAL AGE > 9:: YES
LAB DIRECTOR NAME PROVIDER: NORMAL
LABORATORY COMMENT REPORT: NORMAL
LIMITATIONS OF THE TEST: NORMAL
NEGATIVE PREDICTIVE VALUE: NORMAL
PERFORMANCE CHARACTERISTICS: NORMAL
POSITIVE PREDICTIVE VALUE: NORMAL
REF LAB TEST METHOD: NORMAL
SL AMB NOTE:: NORMAL
TEST PERFORMANCE INFO SPEC: NORMAL

## 2025-07-29 ENCOUNTER — ROUTINE PRENATAL (OUTPATIENT)
Dept: OBGYN CLINIC | Facility: CLINIC | Age: 20
End: 2025-07-29

## 2025-07-29 VITALS
BODY MASS INDEX: 23.74 KG/M2 | DIASTOLIC BLOOD PRESSURE: 52 MMHG | WEIGHT: 129 LBS | SYSTOLIC BLOOD PRESSURE: 98 MMHG | HEIGHT: 62 IN | HEART RATE: 86 BPM

## 2025-07-29 DIAGNOSIS — Z3A.14 14 WEEKS GESTATION OF PREGNANCY: ICD-10-CM

## 2025-07-29 DIAGNOSIS — R11.2 NAUSEA AND VOMITING, UNSPECIFIED VOMITING TYPE: ICD-10-CM

## 2025-07-29 DIAGNOSIS — N76.0 BACTERIAL VAGINOSIS: ICD-10-CM

## 2025-07-29 DIAGNOSIS — B96.89 BACTERIAL VAGINOSIS: ICD-10-CM

## 2025-07-29 DIAGNOSIS — Z34.02 PRENATAL CARE, FIRST PREGNANCY IN SECOND TRIMESTER: Primary | ICD-10-CM

## 2025-07-29 LAB
BV WHIFF TEST VAG QL: POSITIVE
CLUE CELLS SPEC QL WET PREP: POSITIVE
PH SMN: 5.5 [PH]
SL AMB POCT WET MOUNT: ABNORMAL
T VAGINALIS VAG QL WET PREP: NEGATIVE
YEAST VAG QL WET PREP: NEGATIVE

## 2025-07-29 PROCEDURE — 87210 SMEAR WET MOUNT SALINE/INK: CPT | Performed by: OBSTETRICS & GYNECOLOGY

## 2025-07-29 PROCEDURE — 99213 OFFICE O/P EST LOW 20 MIN: CPT | Performed by: OBSTETRICS & GYNECOLOGY

## 2025-07-29 RX ORDER — SWAB
1 SWAB, NON-MEDICATED MISCELLANEOUS DAILY
Qty: 30 TABLET | Refills: 5 | Status: SHIPPED | OUTPATIENT
Start: 2025-07-29 | End: 2026-01-25

## 2025-07-29 RX ORDER — FAMOTIDINE 20 MG/1
20 TABLET, FILM COATED ORAL DAILY
Qty: 30 TABLET | Refills: 1 | Status: SHIPPED | OUTPATIENT
Start: 2025-07-29

## 2025-07-29 RX ORDER — METRONIDAZOLE 500 MG/1
500 TABLET ORAL EVERY 12 HOURS SCHEDULED
Qty: 10 TABLET | Refills: 0 | Status: SHIPPED | OUTPATIENT
Start: 2025-07-29 | End: 2025-08-03